# Patient Record
Sex: FEMALE | Race: BLACK OR AFRICAN AMERICAN | Employment: OTHER | URBAN - METROPOLITAN AREA
[De-identification: names, ages, dates, MRNs, and addresses within clinical notes are randomized per-mention and may not be internally consistent; named-entity substitution may affect disease eponyms.]

---

## 2017-03-09 ENCOUNTER — HOSPITAL ENCOUNTER (EMERGENCY)
Age: 82
Discharge: HOME | End: 2017-03-10
Attending: EMERGENCY MEDICINE
Payer: MEDICARE

## 2017-03-09 ENCOUNTER — APPOINTMENT (OUTPATIENT)
Dept: CT IMAGING | Age: 82
End: 2017-03-09
Attending: EMERGENCY MEDICINE
Payer: MEDICARE

## 2017-03-09 DIAGNOSIS — R53.1 WEAKNESS: Primary | ICD-10-CM

## 2017-03-09 DIAGNOSIS — Z91.14 NON COMPLIANCE W MEDICATION REGIMEN: ICD-10-CM

## 2017-03-09 LAB
ALBUMIN SERPL BCP-MCNC: 3.2 G/DL (ref 3.2–4.6)
ALBUMIN/GLOB SERPL: 0.7 {RATIO} (ref 1.2–3.5)
ALP SERPL-CCNC: 82 U/L (ref 50–136)
ALT SERPL-CCNC: 16 U/L (ref 12–65)
ANION GAP BLD CALC-SCNC: 8 MMOL/L (ref 7–16)
AST SERPL W P-5'-P-CCNC: 39 U/L (ref 15–37)
BASOPHILS # BLD AUTO: 0 K/UL (ref 0–0.2)
BASOPHILS # BLD: 0 % (ref 0–2)
BILIRUB SERPL-MCNC: 1.3 MG/DL (ref 0.2–1.1)
BUN SERPL-MCNC: 21 MG/DL (ref 8–23)
CALCIUM SERPL-MCNC: 9.9 MG/DL (ref 8.3–10.4)
CHLORIDE SERPL-SCNC: 107 MMOL/L (ref 98–107)
CO2 SERPL-SCNC: 28 MMOL/L (ref 21–32)
CREAT SERPL-MCNC: 1.53 MG/DL (ref 0.6–1)
DIFFERENTIAL METHOD BLD: ABNORMAL
EOSINOPHIL # BLD: 0 K/UL (ref 0–0.8)
EOSINOPHIL NFR BLD: 0 % (ref 0.5–7.8)
ERYTHROCYTE [DISTWIDTH] IN BLOOD BY AUTOMATED COUNT: 15 % (ref 11.9–14.6)
GLOBULIN SER CALC-MCNC: 4.8 G/DL (ref 2.3–3.5)
GLUCOSE SERPL-MCNC: 95 MG/DL (ref 65–100)
HCT VFR BLD AUTO: 41.7 % (ref 35.8–46.3)
HGB BLD-MCNC: 13.4 G/DL (ref 11.7–15.4)
IMM GRANULOCYTES # BLD: 0 K/UL (ref 0–0.5)
IMM GRANULOCYTES NFR BLD AUTO: 0.3 % (ref 0–5)
LACTATE BLD-SCNC: 1.6 MMOL/L (ref 0.5–1.9)
LYMPHOCYTES # BLD AUTO: 7 % (ref 13–44)
LYMPHOCYTES # BLD: 0.7 K/UL (ref 0.5–4.6)
MCH RBC QN AUTO: 26.9 PG (ref 26.1–32.9)
MCHC RBC AUTO-ENTMCNC: 32.1 G/DL (ref 31.4–35)
MCV RBC AUTO: 83.6 FL (ref 79.6–97.8)
MONOCYTES # BLD: 0.7 K/UL (ref 0.1–1.3)
MONOCYTES NFR BLD AUTO: 7 % (ref 4–12)
NEUTS SEG # BLD: 8 K/UL (ref 1.7–8.2)
NEUTS SEG NFR BLD AUTO: 86 % (ref 43–78)
PLATELET # BLD AUTO: 176 K/UL (ref 150–450)
PMV BLD AUTO: 12.1 FL (ref 10.8–14.1)
POTASSIUM SERPL-SCNC: 5.4 MMOL/L (ref 3.5–5.1)
PROT SERPL-MCNC: 8 G/DL (ref 6.3–8.2)
RBC # BLD AUTO: 4.99 M/UL (ref 4.05–5.25)
SODIUM SERPL-SCNC: 143 MMOL/L (ref 136–145)
WBC # BLD AUTO: 9.4 K/UL (ref 4.3–11.1)

## 2017-03-09 PROCEDURE — 84484 ASSAY OF TROPONIN QUANT: CPT

## 2017-03-09 PROCEDURE — 81003 URINALYSIS AUTO W/O SCOPE: CPT | Performed by: EMERGENCY MEDICINE

## 2017-03-09 PROCEDURE — 70450 CT HEAD/BRAIN W/O DYE: CPT

## 2017-03-09 PROCEDURE — 99285 EMERGENCY DEPT VISIT HI MDM: CPT | Performed by: EMERGENCY MEDICINE

## 2017-03-09 PROCEDURE — 83605 ASSAY OF LACTIC ACID: CPT

## 2017-03-09 PROCEDURE — 81015 MICROSCOPIC EXAM OF URINE: CPT | Performed by: EMERGENCY MEDICINE

## 2017-03-09 PROCEDURE — 85025 COMPLETE CBC W/AUTO DIFF WBC: CPT | Performed by: EMERGENCY MEDICINE

## 2017-03-09 PROCEDURE — 80053 COMPREHEN METABOLIC PANEL: CPT | Performed by: EMERGENCY MEDICINE

## 2017-03-10 VITALS
RESPIRATION RATE: 22 BRPM | BODY MASS INDEX: 29.63 KG/M2 | DIASTOLIC BLOOD PRESSURE: 89 MMHG | WEIGHT: 161 LBS | TEMPERATURE: 98 F | SYSTOLIC BLOOD PRESSURE: 164 MMHG | HEART RATE: 68 BPM | OXYGEN SATURATION: 98 % | HEIGHT: 62 IN

## 2017-03-10 LAB
BACTERIA URNS QL MICRO: 0 /HPF
CASTS URNS QL MICRO: NORMAL /LPF
EPI CELLS #/AREA URNS HPF: NORMAL /HPF
RBC #/AREA URNS HPF: NORMAL /HPF
WBC URNS QL MICRO: NORMAL /HPF

## 2017-03-10 NOTE — DISCHARGE INSTRUCTIONS
Fatigue: Care Instructions  Your Care Instructions  Fatigue is a feeling of tiredness, exhaustion, or lack of energy. You may feel fatigue because of too much or not enough activity. It can also come from stress, lack of sleep, boredom, and poor diet. Many medical problems, such as viral infections, can cause fatigue. Emotional problems, especially depression, are often the cause of fatigue. Fatigue is most often a symptom of another problem. Treatment for fatigue depends on the cause. For example, if you have fatigue because you have a certain health problem, treating this problem also treats your fatigue. If depression or anxiety is the cause, treatment may help. Follow-up care is a key part of your treatment and safety. Be sure to make and go to all appointments, and call your doctor if you are having problems. It's also a good idea to know your test results and keep a list of the medicines you take. How can you care for yourself at home? · Get regular exercise. But don't overdo it. Go back and forth between rest and exercise. · Get plenty of rest.  · Eat a healthy diet. Do not skip meals, especially breakfast.  · Reduce your use of caffeine, tobacco, and alcohol. Caffeine is most often found in coffee, tea, cola drinks, and chocolate. · Limit medicines that can cause fatigue. This includes tranquilizers and cold and allergy medicines. When should you call for help? Watch closely for changes in your health, and be sure to contact your doctor if:  · You have new symptoms such as fever or a rash. · Your fatigue gets worse. · You have been feeling down, depressed, or hopeless. Or you may have lost interest in things that you usually enjoy. · You are not getting better as expected. Where can you learn more? Go to http://raiza-betsy.info/. Enter B571 in the search box to learn more about \"Fatigue: Care Instructions. \"  Current as of: May 27, 2016  Content Version: 11.1  © 3949-7303 [employer], [employer]. Care instructions adapted under license by [employer] (which disclaims liability or warranty for this information). If you have questions about a medical condition or this instruction, always ask your healthcare professional. [address] any warranty or liability for your use of this information. Weakness: Care Instructions  Your Care Instructions  Weakness is a lack of physical or muscle strength. You may feel that you need to make extra effort to move your arms, legs, or other muscles. Generalized weakness means that you feel weak in most areas of your body. Another type of weakness may affect just one muscle or group of muscles. You may feel weak and tired after you have done too much activity, such as taking an extra-long hike. This is not a serious problem. It often goes away on its own. Feeling weak can also be caused by medical conditions like thyroid problems, depression, or a virus. Sometimes the cause can be serious. Your doctor may want to do more tests to try to find the cause of the weakness. The doctor has checked you carefully, but problems can develop later. If you notice any problems or new symptoms, get medical treatment right away. Follow-up care is a key part of your treatment and safety. Be sure to make and go to all appointments, and call your doctor if you are having problems. It's also a good idea to know your test results and keep a list of the medicines you take. How can you care for yourself at home? · Rest when you feel tired. · Be safe with medicines. If your doctor prescribed medicine, take it exactly as prescribed. Call your doctor if you think you are having a problem with your medicine. You will get more details on the specific medicines your doctor prescribes. · Do not skip meals. Eating a balanced diet may increase your energy level.   · Get some physical activity every day, but do not get too tired.  When should you call for help? Call your doctor now or seek immediate medical care if:  · You have new or worse weakness. · You are dizzy or lightheaded, or you feel like you may faint. Watch closely for changes in your health, and be sure to contact your doctor if:  · You do not get better as expected. Where can you learn more? Go to http://raiza-betsy.info/. Enter [id number] [id number] in the search box to learn more about \"Weakness: Care Instructions. \"  Current as of: May 27, 2016  Content Version: 11.1  © 4052-4087 [employer]. Care instructions adapted under license by [employer] (which disclaims liability or warranty for this information). If you have questions about a medical condition or this instruction, always ask your healthcare professional. [address] any warranty or liability for your use of this information.

## 2017-03-10 NOTE — ED TRIAGE NOTES
PT arrived via EMS. Per EMS PT got dizzy and laid down.  EMS was called for a welfare check and found PT on floor

## 2017-03-10 NOTE — ED PROVIDER NOTES
Patient is a 80 y.o. female presenting with dizziness. The history is provided by the patient. Dizziness   This is a new problem. The current episode started 3 to 5 hours ago. The problem has not changed since onset. There was no focality noted. Pertinent negatives include no focal weakness, no loss of sensation, no loss of balance, no slurred speech, no speech difficulty, no memory loss, no movement disorder, no agitation, no visual change, no auditory change, no mental status change, no unresponsiveness and no disorientation. There has been no fever. Pertinent negatives include no vomiting, no altered mental status, no confusion, no headaches, no choking and no nausea. Associated medical issues do not include trauma, mood changes, bleeding disorder, seizures, dementia, CVA or clotting disorder. Past Medical History:   Diagnosis Date    Anemia 4/21/2016    Anemia due to blood loss, acute 2014    Arthritis     osteo    Atrial fibrillation ([facility] [address] 4/21/2016    Cancer ([facility] [address] Jan 2002    left renal (nephrectomy) and cervical ([facility], [facility])  also had chemo and radiation    DVT (deep venous thrombosis) ([facility] [address] 7/2014    Dyspnea on exertion     started 2009.   No associated chest pain    Edema     Environmental allergies     controlled with occasional loratadine    GERD (gastroesophageal reflux disease)     controlled with prn Ranitidine    HLD (hyperlipidemia) 4/21/2016    Hyperlipidemia     Hypertension     controlled with medication    Hypokalemia 2014    Incisional hernia     Low serum sodium 2014    Melena 2014    PE (pulmonary embolism) 7/2014    Pulmonary HTN (HCC)     Tricuspid regurgitation     Mild to Moderate    Vitamin D deficiency        Past Surgical History:   Procedure Laterality Date    HX HERNIA REPAIR  6/15/12    Dr. Marce Fernandez HX UROLOGICAL  2002    left nephrectomy         Family History:   Problem Relation Age of Onset    Cancer Mother    [address] [facility] Hypertension Mother     Stroke Father     Heart Disease Father        Social History     Social History    Marital status: SINGLE     Spouse name: N/A    Number of children: N/A    Years of education: N/A     Occupational History    Housekeeping      Retired     Social History Main Topics    Smoking status: Former Smoker     Packs/day: 0.50     Years: 10.00     Types: Cigarettes     Quit date: 6/8/2000    Smokeless tobacco: Never Used      Comment: Stopped tobacco use 14 years ago    Alcohol use No    Drug use: No    Sexual activity: Not on file     Other Topics Concern    Not on file     Social History Narrative    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 ALLERGIES: Lipitor [atorvastatin] and Pcn [penicillins]    Review of Systems   Constitutional: Negative for chills and fever. Respiratory: Negative for choking. Gastrointestinal: Negative for nausea and vomiting. Neurological: Positive for dizziness. Negative for focal weakness, speech difficulty, headaches and loss of balance. Psychiatric/Behavioral: Negative for agitation, confusion and memory loss. All other systems reviewed and are negative. Vitals:    03/09/17 2116 03/09/17 2121 03/09/17 2122   BP: (!) 222/88 194/79    Pulse: 63 77 63   Resp: 14  19   Temp: 98.4 °F (36.9 °C)     SpO2: 97% 95% 96%   Weight: 73 kg (161 lb)     Height: 5' 2\" (1.575 m)              Physical Exam   Constitutional: She is oriented to person, place, and time. She appears well-developed and well-nourished. No distress. HENT:   Head: Normocephalic and atraumatic. Right Ear: Tympanic membrane and external ear normal.   Left Ear: Tympanic membrane and external ear normal.   Mouth/Throat: Oropharynx is clear and moist.   Eyes: Conjunctivae and EOM are normal. Pupils are equal, round, and reactive to light. Neck: Normal range of motion. Neck supple. No tracheal deviation present.    Cardiovascular: Normal rate, regular rhythm, normal heart sounds and intact distal pulses. Exam reveals no gallop and no friction rub. No murmur heard. Pulmonary/Chest: Effort normal and breath sounds normal. No respiratory distress. She has no wheezes. Abdominal: Soft. Bowel sounds are normal. She exhibits no distension and no mass. There is no hepatosplenomegaly. There is no tenderness. There is no rebound and no guarding. Musculoskeletal: Normal range of motion. She exhibits no edema. Lymphadenopathy:     She has no cervical adenopathy. Neurological: She is alert and oriented to person, place, and time. She has normal strength. She displays normal reflexes. No cranial nerve deficit or sensory deficit. Coordination normal.   Skin: Skin is warm and dry. No rash noted. She is not diaphoretic. No erythema. Psychiatric: She has a normal mood and affect. Her speech is normal and behavior is normal. Judgment and thought content normal. She exhibits abnormal recent memory. Nursing note and vitals reviewed. MDM  Number of Diagnoses or Management Options  Non compliance w medication regimen: new and requires workup  Weakness: new and requires workup     Amount and/or Complexity of Data Reviewed  Clinical lab tests: ordered and reviewed  Tests in the radiology section of CPT®: ordered and reviewed  Decide to obtain previous medical records or to obtain history from someone other than the patient: yes  Obtain history from someone other than the patient: yes  Review and summarize past medical records: yes  Independent visualization of images, tracings, or specimens: yes    Risk of Complications, Morbidity, and/or Mortality  Presenting problems: high  Diagnostic procedures: high  Management options: moderate    Patient Progress  Patient progress: stable    ED Course       Procedures      The patient was observed in the ED. Daughter will arrange home health to visit daily to ensure the patient is taking her medications.     Results Reviewed:      Recent Results (from the past 24 hour(s))   CBC WITH AUTOMATED DIFF    Collection Time: 03/09/17  9:19 PM   Result Value Ref Range    WBC 9.4 4.3 - 11.1 K/uL    RBC 4.99 4.05 - 5.25 M/uL    HGB 13.4 11.7 - 15.4 g/dL    HCT 41.7 35.8 - 46.3 %    MCV 83.6 79.6 - 97.8 FL    MCH 26.9 26.1 - 32.9 PG    MCHC 32.1 31.4 - 35.0 g/dL    RDW 15.0 (H) 11.9 - 14.6 %    PLATELET 263 410 - 863 K/uL    MPV 12.1 10.8 - 14.1 FL    DF AUTOMATED      NEUTROPHILS 86 (H) 43 - 78 %    LYMPHOCYTES 7 (L) 13 - 44 %    MONOCYTES 7 4.0 - 12.0 %    EOSINOPHILS 0 (L) 0.5 - 7.8 %    BASOPHILS 0 0.0 - 2.0 %    IMMATURE GRANULOCYTES 0.3 0.0 - 5.0 %    ABS. NEUTROPHILS 8.0 1.7 - 8.2 K/UL    ABS. LYMPHOCYTES 0.7 0.5 - 4.6 K/UL    ABS. MONOCYTES 0.7 0.1 - 1.3 K/UL    ABS. EOSINOPHILS 0.0 0.0 - 0.8 K/UL    ABS. BASOPHILS 0.0 0.0 - 0.2 K/UL    ABS. IMM. GRANS. 0.0 0.0 - 0.5 K/UL   METABOLIC PANEL, COMPREHENSIVE    Collection Time: 03/09/17  9:19 PM   Result Value Ref Range    Sodium 143 136 - 145 mmol/L    Potassium 5.4 (H) 3.5 - 5.1 mmol/L    Chloride 107 98 - 107 mmol/L    CO2 28 21 - 32 mmol/L    Anion gap 8 7 - 16 mmol/L    Glucose 95 65 - 100 mg/dL    BUN 21 8 - 23 MG/DL    Creatinine 1.53 (H) 0.6 - 1.0 MG/DL    GFR est AA 41 (L) >60 ml/min/1.73m2    GFR est non-AA 34 (L) >60 ml/min/1.73m2    Calcium 9.9 8.3 - 10.4 MG/DL    Bilirubin, total 1.3 (H) 0.2 - 1.1 MG/DL    ALT (SGPT) 16 12 - 65 U/L    AST (SGOT) 39 (H) 15 - 37 U/L    Alk. phosphatase 82 50 - 136 U/L    Protein, total 8.0 6.3 - 8.2 g/dL    Albumin 3.2 3.2 - 4.6 g/dL    Globulin 4.8 (H) 2.3 - 3.5 g/dL    A-G Ratio 0.7 (L) 1.2 - 3.5     POC LACTIC ACID    Collection Time: 03/09/17  9:22 PM   Result Value Ref Range    Lactic Acid (POC) 1.6 0.5 - 1.9 mmol/L     CT HEAD WO CONT   Final Result   IMPRESSION:      No acute intracranial abnormalities.       Date of Dictation: 3/9/2017 11:35 PM             I discussed the results of all labs, procedures, radiographs, and treatments with the patient and available family. Treatment plan is agreed upon and the patient is ready for discharge. All voiced understanding of the discharge plan and medication instructions or changes as appropriate. Questions about treatment in the ED were answered. All were encouraged to return should symptoms worsen or new problems develop.

## 2017-03-11 LAB — TROPONIN I BLD-MCNC: 0.01 NG/ML (ref 0–0.08)

## 2017-08-07 ENCOUNTER — APPOINTMENT (OUTPATIENT)
Dept: CT IMAGING | Age: 82
End: 2017-08-07
Attending: EMERGENCY MEDICINE
Payer: MEDICARE

## 2017-08-07 ENCOUNTER — HOSPITAL ENCOUNTER (EMERGENCY)
Age: 82
Discharge: HOME | End: 2017-08-07
Attending: EMERGENCY MEDICINE
Payer: MEDICARE

## 2017-08-07 VITALS
DIASTOLIC BLOOD PRESSURE: 91 MMHG | WEIGHT: 155 LBS | HEIGHT: 62 IN | OXYGEN SATURATION: 95 % | BODY MASS INDEX: 28.52 KG/M2 | RESPIRATION RATE: 18 BRPM | SYSTOLIC BLOOD PRESSURE: 152 MMHG | HEART RATE: 103 BPM | TEMPERATURE: 98.6 F

## 2017-08-07 DIAGNOSIS — E86.0 DEHYDRATION: ICD-10-CM

## 2017-08-07 DIAGNOSIS — I71.40 ABDOMINAL ANEURYSM: Primary | ICD-10-CM

## 2017-08-07 DIAGNOSIS — E87.6 HYPOKALEMIA: ICD-10-CM

## 2017-08-07 DIAGNOSIS — N18.9 CHRONIC KIDNEY DISEASE, UNSPECIFIED STAGE: ICD-10-CM

## 2017-08-07 LAB
ALBUMIN SERPL BCP-MCNC: 3.3 G/DL (ref 3.2–4.6)
ALBUMIN/GLOB SERPL: 0.7 {RATIO} (ref 1.2–3.5)
ALP SERPL-CCNC: 81 U/L (ref 50–136)
ALT SERPL-CCNC: 29 U/L (ref 12–65)
AMPHET UR QL SCN: NEGATIVE
ANION GAP BLD CALC-SCNC: 13 MMOL/L (ref 7–16)
AST SERPL W P-5'-P-CCNC: 27 U/L (ref 15–37)
BACTERIA URNS QL MICRO: 0 /HPF
BARBITURATES UR QL SCN: NEGATIVE
BASOPHILS # BLD AUTO: 0 K/UL (ref 0–0.2)
BASOPHILS # BLD: 0 % (ref 0–2)
BENZODIAZ UR QL: NEGATIVE
BILIRUB SERPL-MCNC: 1.2 MG/DL (ref 0.2–1.1)
BUN SERPL-MCNC: 31 MG/DL (ref 8–23)
CALCIUM SERPL-MCNC: 9.4 MG/DL (ref 8.3–10.4)
CANNABINOIDS UR QL SCN: NEGATIVE
CASTS URNS QL MICRO: NORMAL /LPF
CHLORIDE SERPL-SCNC: 104 MMOL/L (ref 98–107)
CK SERPL-CCNC: 428 U/L (ref 21–215)
CO2 SERPL-SCNC: 27 MMOL/L (ref 21–32)
COCAINE UR QL SCN: NEGATIVE
CREAT SERPL-MCNC: 2.19 MG/DL (ref 0.6–1)
DIFFERENTIAL METHOD BLD: ABNORMAL
EOSINOPHIL # BLD: 0.2 K/UL (ref 0–0.8)
EOSINOPHIL NFR BLD: 2 % (ref 0.5–7.8)
EPI CELLS #/AREA URNS HPF: NORMAL /HPF
ERYTHROCYTE [DISTWIDTH] IN BLOOD BY AUTOMATED COUNT: 14.9 % (ref 11.9–14.6)
ETHANOL SERPL-MCNC: <3 MG/DL
GLOBULIN SER CALC-MCNC: 4.5 G/DL (ref 2.3–3.5)
GLUCOSE BLD STRIP.AUTO-MCNC: 137 MG/DL (ref 65–100)
GLUCOSE SERPL-MCNC: 143 MG/DL (ref 65–100)
HCT VFR BLD AUTO: 43.4 % (ref 35.8–46.3)
HGB BLD-MCNC: 13.8 G/DL (ref 11.7–15.4)
IMM GRANULOCYTES # BLD: 0 K/UL (ref 0–0.5)
IMM GRANULOCYTES NFR BLD AUTO: 0.2 % (ref 0–5)
LACTATE BLD-SCNC: 2.4 MMOL/L (ref 0.5–1.9)
LYMPHOCYTES # BLD AUTO: 16 % (ref 13–44)
LYMPHOCYTES # BLD: 1.3 K/UL (ref 0.5–4.6)
MCH RBC QN AUTO: 26.2 PG (ref 26.1–32.9)
MCHC RBC AUTO-ENTMCNC: 31.8 G/DL (ref 31.4–35)
MCV RBC AUTO: 82.5 FL (ref 79.6–97.8)
METHADONE UR QL: NEGATIVE
MONOCYTES # BLD: 0.5 K/UL (ref 0.1–1.3)
MONOCYTES NFR BLD AUTO: 6 % (ref 4–12)
NEUTS SEG # BLD: 6.3 K/UL (ref 1.7–8.2)
NEUTS SEG NFR BLD AUTO: 76 % (ref 43–78)
OPIATES UR QL: NEGATIVE
PCP UR QL: NEGATIVE
PLATELET # BLD AUTO: 217 K/UL (ref 150–450)
PMV BLD AUTO: 11.5 FL (ref 10.8–14.1)
POTASSIUM SERPL-SCNC: 2.9 MMOL/L (ref 3.5–5.1)
PROT SERPL-MCNC: 7.8 G/DL (ref 6.3–8.2)
RBC # BLD AUTO: 5.26 M/UL (ref 4.05–5.25)
RBC #/AREA URNS HPF: NORMAL /HPF
SODIUM SERPL-SCNC: 144 MMOL/L (ref 136–145)
WBC # BLD AUTO: 8.3 K/UL (ref 4.3–11.1)
WBC URNS QL MICRO: NORMAL /HPF

## 2017-08-07 PROCEDURE — 74176 CT ABD & PELVIS W/O CONTRAST: CPT

## 2017-08-07 PROCEDURE — 74011250636 HC RX REV CODE- 250/636: Performed by: EMERGENCY MEDICINE

## 2017-08-07 PROCEDURE — 81003 URINALYSIS AUTO W/O SCOPE: CPT | Performed by: EMERGENCY MEDICINE

## 2017-08-07 PROCEDURE — 96361 HYDRATE IV INFUSION ADD-ON: CPT | Performed by: EMERGENCY MEDICINE

## 2017-08-07 PROCEDURE — 82962 GLUCOSE BLOOD TEST: CPT

## 2017-08-07 PROCEDURE — 82550 ASSAY OF CK (CPK): CPT | Performed by: EMERGENCY MEDICINE

## 2017-08-07 PROCEDURE — 81015 MICROSCOPIC EXAM OF URINE: CPT | Performed by: EMERGENCY MEDICINE

## 2017-08-07 PROCEDURE — 80053 COMPREHEN METABOLIC PANEL: CPT | Performed by: EMERGENCY MEDICINE

## 2017-08-07 PROCEDURE — 96360 HYDRATION IV INFUSION INIT: CPT | Performed by: EMERGENCY MEDICINE

## 2017-08-07 PROCEDURE — 74011250637 HC RX REV CODE- 250/637: Performed by: EMERGENCY MEDICINE

## 2017-08-07 PROCEDURE — 83605 ASSAY OF LACTIC ACID: CPT

## 2017-08-07 PROCEDURE — 80307 DRUG TEST PRSMV CHEM ANLYZR: CPT | Performed by: EMERGENCY MEDICINE

## 2017-08-07 PROCEDURE — 85025 COMPLETE CBC W/AUTO DIFF WBC: CPT | Performed by: EMERGENCY MEDICINE

## 2017-08-07 PROCEDURE — 99285 EMERGENCY DEPT VISIT HI MDM: CPT | Performed by: EMERGENCY MEDICINE

## 2017-08-07 RX ORDER — SODIUM CHLORIDE 9 MG/ML
500 INJECTION, SOLUTION INTRAVENOUS ONCE
Status: DISCONTINUED | OUTPATIENT
Start: 2017-08-07 | End: 2017-08-07

## 2017-08-07 RX ORDER — POTASSIUM CHLORIDE 20 MEQ/1
40 TABLET, EXTENDED RELEASE ORAL
Status: COMPLETED | OUTPATIENT
Start: 2017-08-07 | End: 2017-08-07

## 2017-08-07 RX ORDER — SODIUM CHLORIDE 9 MG/ML
1000 INJECTION, SOLUTION INTRAVENOUS ONCE
Status: COMPLETED | OUTPATIENT
Start: 2017-08-07 | End: 2017-08-07

## 2017-08-07 RX ADMIN — SODIUM CHLORIDE 1000 ML: 900 INJECTION, SOLUTION INTRAVENOUS at 16:45

## 2017-08-07 RX ADMIN — POTASSIUM CHLORIDE 40 MEQ: 20 TABLET, EXTENDED RELEASE ORAL at 19:18

## 2017-08-07 NOTE — ED PROVIDER NOTES
HPI Comments: Found on floor by family on Saturday. Possibly dehydrated with recent diarrhea. Mental status improved but still globally weak. Patient is a 80 y.o. female presenting with altered mental status. The history is provided by the patient and a relative. Altered mental status    This is a new problem. The current episode started yesterday. The problem has been gradually improving. Associated symptoms include confusion, unresponsiveness and weakness. Mental status baseline is mild dementia. Risk factors include trauma, dementia and a recent illness. Her past medical history is significant for dementia. Her past medical history does not include seizures or CVA. Past Medical History:   Diagnosis Date    Anemia 4/21/2016    Anemia due to blood loss, acute 2014    Arthritis     osteo    Atrial fibrillation ([address] 4/21/2016    Cancer ([address] Jan 2002    left renal (nephrectomy) and cervical ([facility], [facility])  also had chemo and radiation    DVT (deep venous thrombosis) ([facility] [address] 7/2014    Dyspnea on exertion     started 2009.   No associated chest pain    Edema     Environmental allergies     controlled with occasional loratadine    GERD (gastroesophageal reflux disease)     controlled with prn Ranitidine    HLD (hyperlipidemia) 4/21/2016    Hyperlipidemia     Hypertension     controlled with medication    Hypokalemia 2014    Incisional hernia     Low serum sodium 2014    Melena 2014    PE (pulmonary embolism) 7/2014    Pulmonary HTN (HCC)     Tricuspid regurgitation     Mild to Moderate    Vitamin D deficiency        Past Surgical History:   Procedure Laterality Date    HX HERNIA REPAIR  6/15/12    Dr. Donna Lopez HX UROLOGICAL  2002    left nephrectomy         Family History:   Problem Relation Age of Onset   [employer] Cancer Mother     Hypertension Mother     Stroke Father     Heart Disease Father        Social History     Social History    Marital status: SINGLE Spouse name: N/A    Number of children: N/A    Years of education: N/A     Occupational History    Housekeeping      Retired     Social History Main Topics    Smoking status: Former Smoker     Packs/day: 0.50     Years: 10.00     Types: Cigarettes     Quit date: 6/8/2000    Smokeless tobacco: Never Used      Comment: Stopped tobacco use 14 years ago    Alcohol use No    Drug use: No    Sexual activity: Not on file     Other Topics Concern    Not on file     Social History Narrative    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 ALLERGIES: Lipitor [atorvastatin] and Pcn [penicillins]    Review of Systems   Neurological: Positive for weakness. Psychiatric/Behavioral: Positive for confusion. Vitals:    08/07/17 1450 08/07/17 1600 08/07/17 1722 08/07/17 1741   BP: 132/76  158/70 158/71   Pulse: (!) 103      Resp: 20      Temp: 98.4 °F (36.9 °C)      SpO2: 98% 98% 97% (!) 88%   Weight: 70.3 kg (155 lb)      Height: 5' 2\" (1.575 m)               Physical Exam   Constitutional: She is oriented to person, place, and time. She appears well-developed and well-nourished. HENT:   Head: Normocephalic and atraumatic. Cardiovascular: Intact distal pulses. Pulmonary/Chest: Effort normal.   Abdominal: Soft. Neurological: She is alert and oriented to person, place, and time. Skin: Skin is warm and dry. Psychiatric: She has a normal mood and affect. Her behavior is normal.   Nursing note and vitals reviewed. MDM  Number of Diagnoses or Management Options  Abdominal aneurysm ([address]): new and requires workup  Chronic kidney disease, unspecified stage: new and requires workup  Dehydration: new and requires workup  Hypokalemia: new and requires workup  Diagnosis management comments: Discussed results with patient and family. According to family members bedside patient looks much better at this time. Appears was moderately dehydrated after not eating or drinking for the last 24-48 hours. Patient has had some fluids orally in the ER without any complications. Plans currently. Recommend short-term follow-up. Incidental findings on CT discussed with family and routine follow-up also suggested. Mild hypokalemia, by mouth provided the ER and suggested this be followed by an outpatient physician. Unlikely to be related to patient's confusion as described by family members. Abnormal vital documented at time of discharge but appears to be anomalous as patient has not had any abnormalities up to this point and on recheck myself appears to be stable with unchanged vital signs. I discussed the results of all labs, procedures, radiographs, and treatments with the patient and available family. Treatment plan is agreed upon and the patient is ready for discharge. Questions about treatment in the ED and differential diagnosis of presenting condition were answered. Patient was given verbal discharge instructions including, but not limited to, importance of returning to the emergency department for any concern of worsening or continued symptoms. Instructions were given to follow up with a primary care provider or specialist within 1-2 days. Adverse effects of medications, if prescribed, were discussed and patient was advised to refrain from significant physical activity until followed up by primary care physician and to not drive or operate heavy machinery after taking any sedating substances.         ED Course       Procedures

## 2017-08-07 NOTE — ED TRIAGE NOTES
Per the family the has not been eating, found the pt on the floor Saturday night. Family states the pt had diarrhea today and the home health care told the family to come to the hospital.  Pt is confused to place and time. Pt denies any pain.  Family states

## 2018-04-12 ENCOUNTER — HOSPITAL ENCOUNTER (OUTPATIENT)
Dept: LAB | Age: 83
Discharge: HOME | End: 2018-04-12
Payer: COMMERCIAL

## 2018-04-12 DIAGNOSIS — D64.9 ANEMIA, UNSPECIFIED TYPE: ICD-10-CM

## 2018-04-12 LAB
BASOPHILS # BLD: 0 K/UL (ref 0–0.2)
BASOPHILS NFR BLD: 0 % (ref 0–2)
DIFFERENTIAL METHOD BLD: ABNORMAL
EOSINOPHIL # BLD: 0.2 K/UL (ref 0–0.8)
EOSINOPHIL NFR BLD: 3 % (ref 0.5–7.8)
ERYTHROCYTE [DISTWIDTH] IN BLOOD BY AUTOMATED COUNT: 15 % (ref 11.9–14.6)
HCT VFR BLD AUTO: 37.4 % (ref 35.8–46.3)
HGB BLD-MCNC: 11.7 G/DL (ref 11.7–15.4)
LYMPHOCYTES # BLD: 1.1 K/UL (ref 0.5–4.6)
LYMPHOCYTES NFR BLD: 18 % (ref 13–44)
MCH RBC QN AUTO: 26.3 PG (ref 26.1–32.9)
MCHC RBC AUTO-ENTMCNC: 31.3 G/DL (ref 31.4–35)
MCV RBC AUTO: 84 FL (ref 79.6–97.8)
MONOCYTES # BLD: 0.5 K/UL (ref 0.1–1.3)
MONOCYTES NFR BLD: 7 % (ref 4–12)
NEUTS SEG # BLD: 4.5 K/UL (ref 1.7–8.2)
NEUTS SEG NFR BLD: 72 % (ref 43–78)
PLATELET # BLD AUTO: 165 K/UL (ref 150–450)
PMV BLD AUTO: 11.7 FL (ref 10.8–14.1)
RBC # BLD AUTO: 4.45 M/UL (ref 4.05–5.25)
WBC # BLD AUTO: 6.3 K/UL (ref 4.3–11.1)

## 2018-04-12 PROCEDURE — 85025 COMPLETE CBC W/AUTO DIFF WBC: CPT | Performed by: INTERNAL MEDICINE

## 2018-04-12 PROCEDURE — 36415 COLL VENOUS BLD VENIPUNCTURE: CPT | Performed by: INTERNAL MEDICINE

## 2018-05-11 ENCOUNTER — HOSPITAL ENCOUNTER (INPATIENT)
Age: 83
LOS: 6 days | Discharge: NURSING, REHAB OR LONG-TERM CARE | DRG: 871 | End: 2018-05-18
Attending: EMERGENCY MEDICINE | Admitting: FAMILY MEDICINE
Payer: COMMERCIAL

## 2018-05-11 ENCOUNTER — APPOINTMENT (OUTPATIENT)
Dept: GENERAL RADIOLOGY | Age: 83
DRG: 871 | End: 2018-05-11
Attending: EMERGENCY MEDICINE
Payer: COMMERCIAL

## 2018-05-11 ENCOUNTER — APPOINTMENT (OUTPATIENT)
Dept: GENERAL RADIOLOGY | Age: 83
DRG: 871 | End: 2018-05-11
Attending: FAMILY MEDICINE
Payer: COMMERCIAL

## 2018-05-11 DIAGNOSIS — R53.81 DEBILITY: ICD-10-CM

## 2018-05-11 DIAGNOSIS — R53.1 WEAKNESS: Primary | ICD-10-CM

## 2018-05-11 DIAGNOSIS — D72.829 LEUKOCYTOSIS, UNSPECIFIED TYPE: ICD-10-CM

## 2018-05-11 LAB
ALBUMIN SERPL-MCNC: 3 G/DL (ref 3.2–4.6)
ALBUMIN/GLOB SERPL: 0.6 {RATIO} (ref 1.2–3.5)
ALP SERPL-CCNC: 77 U/L (ref 50–136)
ALT SERPL-CCNC: 22 U/L (ref 12–65)
ANION GAP SERPL CALC-SCNC: 12 MMOL/L (ref 7–16)
AST SERPL-CCNC: 31 U/L (ref 15–37)
BACTERIA URNS QL MICRO: 0 /HPF
BASOPHILS # BLD: 0 K/UL (ref 0–0.2)
BASOPHILS # BLD: 0 K/UL (ref 0–0.2)
BASOPHILS NFR BLD: 0 % (ref 0–2)
BASOPHILS NFR BLD: 0 % (ref 0–2)
BILIRUB SERPL-MCNC: 1.3 MG/DL (ref 0.2–1.1)
BUN SERPL-MCNC: 21 MG/DL (ref 8–23)
CALCIUM SERPL-MCNC: 9.7 MG/DL (ref 8.3–10.4)
CASTS URNS QL MICRO: NORMAL /LPF
CHLORIDE SERPL-SCNC: 109 MMOL/L (ref 98–107)
CK SERPL-CCNC: 337 U/L (ref 21–215)
CO2 SERPL-SCNC: 22 MMOL/L (ref 21–32)
CREAT SERPL-MCNC: 1.59 MG/DL (ref 0.6–1)
DIFFERENTIAL METHOD BLD: ABNORMAL
DIFFERENTIAL METHOD BLD: ABNORMAL
EOSINOPHIL # BLD: 0 K/UL (ref 0–0.8)
EOSINOPHIL # BLD: 0 K/UL (ref 0–0.8)
EOSINOPHIL NFR BLD: 0 % (ref 0.5–7.8)
EOSINOPHIL NFR BLD: 0 % (ref 0.5–7.8)
EPI CELLS #/AREA URNS HPF: NORMAL /HPF
ERYTHROCYTE [DISTWIDTH] IN BLOOD BY AUTOMATED COUNT: 15.3 % (ref 11.9–14.6)
ERYTHROCYTE [DISTWIDTH] IN BLOOD BY AUTOMATED COUNT: 15.4 % (ref 11.9–14.6)
GLOBULIN SER CALC-MCNC: 4.7 G/DL (ref 2.3–3.5)
GLUCOSE SERPL-MCNC: 165 MG/DL (ref 65–100)
HCT VFR BLD AUTO: 40.9 % (ref 35.8–46.3)
HCT VFR BLD AUTO: 41.1 % (ref 35.8–46.3)
HGB BLD-MCNC: 13.1 G/DL (ref 11.7–15.4)
HGB BLD-MCNC: 13.2 G/DL (ref 11.7–15.4)
IMM GRANULOCYTES # BLD: 0 K/UL (ref 0–0.5)
IMM GRANULOCYTES # BLD: 0.1 K/UL (ref 0–0.5)
IMM GRANULOCYTES NFR BLD AUTO: 0 % (ref 0–5)
IMM GRANULOCYTES NFR BLD AUTO: 0 % (ref 0–5)
LYMPHOCYTES # BLD: 0.8 K/UL (ref 0.5–4.6)
LYMPHOCYTES # BLD: 1 K/UL (ref 0.5–4.6)
LYMPHOCYTES NFR BLD: 6 % (ref 13–44)
LYMPHOCYTES NFR BLD: 8 % (ref 13–44)
MCH RBC QN AUTO: 26.5 PG (ref 26.1–32.9)
MCH RBC QN AUTO: 26.8 PG (ref 26.1–32.9)
MCHC RBC AUTO-ENTMCNC: 31.9 G/DL (ref 31.4–35)
MCHC RBC AUTO-ENTMCNC: 32.3 G/DL (ref 31.4–35)
MCV RBC AUTO: 83 FL (ref 79.6–97.8)
MCV RBC AUTO: 83.1 FL (ref 79.6–97.8)
MONOCYTES # BLD: 0.1 K/UL (ref 0.1–1.3)
MONOCYTES # BLD: 0.7 K/UL (ref 0.1–1.3)
MONOCYTES NFR BLD: 1 % (ref 4–12)
MONOCYTES NFR BLD: 6 % (ref 4–12)
NEUTS SEG # BLD: 11 K/UL (ref 1.7–8.2)
NEUTS SEG # BLD: 11.2 K/UL (ref 1.7–8.2)
NEUTS SEG NFR BLD: 88 % (ref 43–78)
NEUTS SEG NFR BLD: 91 % (ref 43–78)
PLATELET # BLD AUTO: 148 K/UL (ref 150–450)
PLATELET # BLD AUTO: 178 K/UL (ref 150–450)
PMV BLD AUTO: 11 FL (ref 10.8–14.1)
PMV BLD AUTO: 11.5 FL (ref 10.8–14.1)
POTASSIUM SERPL-SCNC: 4.8 MMOL/L (ref 3.5–5.1)
PROT SERPL-MCNC: 7.7 G/DL (ref 6.3–8.2)
RBC # BLD AUTO: 4.92 M/UL (ref 4.05–5.25)
RBC # BLD AUTO: 4.95 M/UL (ref 4.05–5.25)
RBC #/AREA URNS HPF: NORMAL /HPF
SODIUM SERPL-SCNC: 143 MMOL/L (ref 136–145)
WBC # BLD AUTO: 12.1 K/UL (ref 4.3–11.1)
WBC # BLD AUTO: 12.8 K/UL (ref 4.3–11.1)
WBC URNS QL MICRO: NORMAL /HPF

## 2018-05-11 PROCEDURE — 81003 URINALYSIS AUTO W/O SCOPE: CPT | Performed by: EMERGENCY MEDICINE

## 2018-05-11 PROCEDURE — 81015 MICROSCOPIC EXAM OF URINE: CPT | Performed by: EMERGENCY MEDICINE

## 2018-05-11 PROCEDURE — 80053 COMPREHEN METABOLIC PANEL: CPT | Performed by: EMERGENCY MEDICINE

## 2018-05-11 PROCEDURE — 74011250637 HC RX REV CODE- 250/637: Performed by: FAMILY MEDICINE

## 2018-05-11 PROCEDURE — 83690 ASSAY OF LIPASE: CPT | Performed by: EMERGENCY MEDICINE

## 2018-05-11 PROCEDURE — 99218 HC RM OBSERVATION: CPT

## 2018-05-11 PROCEDURE — 99285 EMERGENCY DEPT VISIT HI MDM: CPT | Performed by: EMERGENCY MEDICINE

## 2018-05-11 PROCEDURE — 87040 BLOOD CULTURE FOR BACTERIA: CPT | Performed by: EMERGENCY MEDICINE

## 2018-05-11 PROCEDURE — 74011250637 HC RX REV CODE- 250/637: Performed by: EMERGENCY MEDICINE

## 2018-05-11 PROCEDURE — 87205 SMEAR GRAM STAIN: CPT | Performed by: EMERGENCY MEDICINE

## 2018-05-11 PROCEDURE — 71045 X-RAY EXAM CHEST 1 VIEW: CPT

## 2018-05-11 PROCEDURE — 71046 X-RAY EXAM CHEST 2 VIEWS: CPT

## 2018-05-11 PROCEDURE — 36415 COLL VENOUS BLD VENIPUNCTURE: CPT | Performed by: FAMILY MEDICINE

## 2018-05-11 PROCEDURE — 86580 TB INTRADERMAL TEST: CPT | Performed by: FAMILY MEDICINE

## 2018-05-11 PROCEDURE — 74011000302 HC RX REV CODE- 302: Performed by: FAMILY MEDICINE

## 2018-05-11 PROCEDURE — 85025 COMPLETE CBC W/AUTO DIFF WBC: CPT | Performed by: EMERGENCY MEDICINE

## 2018-05-11 PROCEDURE — 80048 BASIC METABOLIC PNL TOTAL CA: CPT | Performed by: EMERGENCY MEDICINE

## 2018-05-11 PROCEDURE — 82550 ASSAY OF CK (CPK): CPT | Performed by: EMERGENCY MEDICINE

## 2018-05-11 PROCEDURE — 74011250636 HC RX REV CODE- 250/636: Performed by: FAMILY MEDICINE

## 2018-05-11 PROCEDURE — 87077 CULTURE AEROBIC IDENTIFY: CPT | Performed by: EMERGENCY MEDICINE

## 2018-05-11 PROCEDURE — 84484 ASSAY OF TROPONIN QUANT: CPT | Performed by: EMERGENCY MEDICINE

## 2018-05-11 PROCEDURE — 87186 SC STD MICRODIL/AGAR DIL: CPT | Performed by: EMERGENCY MEDICINE

## 2018-05-11 RX ORDER — ONDANSETRON 2 MG/ML
4 INJECTION INTRAMUSCULAR; INTRAVENOUS
Status: DISCONTINUED | OUTPATIENT
Start: 2018-05-11 | End: 2018-05-18 | Stop reason: HOSPADM

## 2018-05-11 RX ORDER — ACETAMINOPHEN 325 MG/1
650 TABLET ORAL
Status: DISCONTINUED | OUTPATIENT
Start: 2018-05-11 | End: 2018-05-18 | Stop reason: HOSPADM

## 2018-05-11 RX ORDER — SODIUM CHLORIDE 0.9 % (FLUSH) 0.9 %
5-10 SYRINGE (ML) INJECTION AS NEEDED
Status: DISCONTINUED | OUTPATIENT
Start: 2018-05-11 | End: 2018-05-18 | Stop reason: HOSPADM

## 2018-05-11 RX ORDER — LORATADINE 10 MG/1
10 TABLET ORAL DAILY
Status: DISCONTINUED | OUTPATIENT
Start: 2018-05-12 | End: 2018-05-18 | Stop reason: HOSPADM

## 2018-05-11 RX ORDER — METOPROLOL TARTRATE 25 MG/1
25 TABLET, FILM COATED ORAL 2 TIMES DAILY
Status: DISCONTINUED | OUTPATIENT
Start: 2018-05-12 | End: 2018-05-18 | Stop reason: HOSPADM

## 2018-05-11 RX ORDER — ADHESIVE BANDAGE
30 BANDAGE TOPICAL DAILY PRN
Status: DISCONTINUED | OUTPATIENT
Start: 2018-05-11 | End: 2018-05-18 | Stop reason: HOSPADM

## 2018-05-11 RX ORDER — SODIUM CHLORIDE 0.9 % (FLUSH) 0.9 %
5-10 SYRINGE (ML) INJECTION EVERY 8 HOURS
Status: DISCONTINUED | OUTPATIENT
Start: 2018-05-11 | End: 2018-05-18 | Stop reason: HOSPADM

## 2018-05-11 RX ORDER — SODIUM CHLORIDE 9 MG/ML
75 INJECTION, SOLUTION INTRAVENOUS CONTINUOUS
Status: DISCONTINUED | OUTPATIENT
Start: 2018-05-11 | End: 2018-05-13

## 2018-05-11 RX ORDER — DONEPEZIL HYDROCHLORIDE 5 MG/1
5 TABLET, FILM COATED ORAL
Status: DISCONTINUED | OUTPATIENT
Start: 2018-05-11 | End: 2018-05-18 | Stop reason: HOSPADM

## 2018-05-11 RX ORDER — AMLODIPINE BESYLATE 5 MG/1
5 TABLET ORAL DAILY
Status: DISCONTINUED | OUTPATIENT
Start: 2018-05-12 | End: 2018-05-18 | Stop reason: HOSPADM

## 2018-05-11 RX ORDER — ACETAMINOPHEN 325 MG/1
650 TABLET ORAL
Status: COMPLETED | OUTPATIENT
Start: 2018-05-11 | End: 2018-05-11

## 2018-05-11 RX ADMIN — DONEPEZIL HYDROCHLORIDE 5 MG: 5 TABLET, FILM COATED ORAL at 23:33

## 2018-05-11 RX ADMIN — TUBERCULIN PURIFIED PROTEIN DERIVATIVE 5 UNITS: 5 INJECTION, SOLUTION INTRADERMAL at 23:36

## 2018-05-11 RX ADMIN — SODIUM CHLORIDE 75 ML/HR: 900 INJECTION, SOLUTION INTRAVENOUS at 23:15

## 2018-05-11 RX ADMIN — Medication 10 ML: at 23:34

## 2018-05-11 RX ADMIN — ACETAMINOPHEN 650 MG: 325 TABLET ORAL at 18:42

## 2018-05-11 NOTE — ED TRIAGE NOTES
Pt arrives via [facility], pt c/o N/V x1 day. Recently started amlodipine. Pt c/o overall weakness. Pt family states that she laid herself down on the floor this morning because she was feeling weak and tired, not sleeping well. Pt has no complaints.

## 2018-05-11 NOTE — ED PROVIDER NOTES
HPI Comments: Elderly patient that lives alone and has some family support plus home health type care. On arrival up home health will today she was found to be in the floor. They're uncertain how long she was there. Patient states that she did not fall nor have any injuries but felt weak and laid down onto the floor and could not not get up. No awareness of fever she did get up and eat breakfast after eating breakfast had gotten up and began to walk when she started to vomit. Denies any abdominal pain. No lower GI symptoms. stage III renal disease with history of left nephrectomy due to malignancy    Patient is a 80 y.o. female presenting with fatigue. The history is provided by the patient. Fatigue   This is a new problem. The current episode started 6 to 12 hours ago. There was no focality noted. Primary symptoms include mental status change. Pertinent negatives include no focal weakness, no loss of sensation, no loss of balance, no speech difficulty and no disorientation. Maximum temperature: uncertain. Associated symptoms include vomiting. Pertinent negatives include no chest pain, no confusion and no headaches. Associated medical issues do not include trauma or seizures. Past Medical History:   Diagnosis Date    Anemia 4/21/2016    Anemia due to blood loss, acute 2014    Arthritis     osteo    Atrial fibrillation ([address] 4/21/2016    Cancer ([facility] [address] Jan 2002    left renal (nephrectomy) and cervical (ROSANNA, BSO)  also had chemo and radiation    DVT (deep venous thrombosis) ([facility] [address] 7/2014    Dyspnea on exertion     started 2009.   No associated chest pain    Edema     Environmental allergies     controlled with occasional loratadine    GERD (gastroesophageal reflux disease)     controlled with prn Ranitidine    HLD (hyperlipidemia) 4/21/2016    Hyperlipidemia     Hypertension     controlled with medication    Hypokalemia 2014    Incisional hernia     Low serum sodium 2014    Melena 2014    PE (pulmonary embolism) 7/2014    Pulmonary HTN (HCC)     Tricuspid regurgitation     Mild to Moderate    Vitamin D deficiency        Past Surgical History:   Procedure Laterality Date    HX HERNIA REPAIR  6/15/12    Dr. Kathalene Olszewski HX UROLOGICAL  2002    left nephrectomy         Family History:   Problem Relation Age of Onset    Cancer Mother     Hypertension Mother     Stroke Father     Heart Disease Father        Social History     Social History    Marital status: SINGLE     Spouse name: N/A    Number of children: N/A    Years of education: N/A     Occupational History    Housekeeping      Retired     Social History Main Topics    Smoking status: Former Smoker     Packs/day: 0.50     Years: 10.00     Types: Cigarettes     Quit date: 6/8/2000    Smokeless tobacco: Never Used      Comment: Stopped tobacco use 14 years ago    Alcohol use No    Drug use: No    Sexual activity: Not on file     Other Topics Concern    Not on file     Social History Narrative    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 ALLERGIES: Lipitor [atorvastatin] and Pcn [penicillins]    Review of Systems   Constitutional: Positive for fatigue. Respiratory: Negative. Cardiovascular: Negative for chest pain. Gastrointestinal: Positive for vomiting. Negative for abdominal pain and diarrhea. Genitourinary: Positive for decreased urine volume. Neurological: Negative. Negative for focal weakness, speech difficulty, headaches and loss of balance. Psychiatric/Behavioral: Positive for decreased concentration. Negative for confusion. All other systems reviewed and are negative. Vitals:    05/11/18 1140 05/11/18 1201   BP: 147/64 162/71   Pulse: (!) 56    Resp: 16    Temp: 97.8 °F (36.6 °C)    SpO2: 97% 96%   Weight: 69.4 kg (153 lb)    Height: 5' 2\" (1.575 m)             Physical Exam   Constitutional: She appears well-developed and well-nourished. No distress.    Quite frail and elderly appearing  Attempt to stand for orthostatics needed assistance   HENT:   Head: Atraumatic. Somewhat dry mucous membranes   Eyes: No scleral icterus. Neck: Neck supple. Cardiovascular: Normal rate. Pulmonary/Chest: Effort normal. No respiratory distress. She has no wheezes. Abdominal: Soft. She exhibits no distension. There is no tenderness. There is no rebound and no guarding. Musculoskeletal: Normal range of motion. Neurological: She is alert. She has normal reflexes. Skin: Skin is warm and dry. Psychiatric: Thought content normal.   Nursing note and vitals reviewed. MDM  Number of Diagnoses or Management Options  Diagnosis management comments: Here with weakness and ending up in the floor no definitive infectious cause but develops a low-grade temperature and never really with rehydration and improved close to her baseline. She will need admission for further evaluation for probable early infectious etiology. Discussed with accompanying daughter.   She is in agreement       Amount and/or Complexity of Data Reviewed  Clinical lab tests: ordered and reviewed  Tests in the radiology section of CPT®: reviewed and ordered  Decide to obtain previous medical records or to obtain history from someone other than the patient: yes  Obtain history from someone other than the patient: yes  Discuss the patient with other providers: yes    Risk of Complications, Morbidity, and/or Mortality  Presenting problems: high  Diagnostic procedures: low  Management options: moderate    Patient Progress  Patient progress: stable        ED Course       Procedures      Recent Results (from the past 12 hour(s))   CBC WITH AUTOMATED DIFF    Collection Time: 05/11/18 11:54 AM   Result Value Ref Range    WBC 12.1 (H) 4.3 - 11.1 K/uL    RBC 4.92 4.05 - 5.25 M/uL    HGB 13.2 11.7 - 15.4 g/dL    HCT 40.9 35.8 - 46.3 %    MCV 83.1 79.6 - 97.8 FL    MCH 26.8 26.1 - 32.9 PG    MCHC 32.3 31.4 - 35.0 g/dL    RDW 15.4 (H) 11.9 - 14.6 %    PLATELET 435 967 - 458 K/uL    MPV 11.5 10.8 - 14.1 FL    DF AUTOMATED      NEUTROPHILS 91 (H) 43 - 78 %    LYMPHOCYTES 8 (L) 13 - 44 %    MONOCYTES 1 (L) 4.0 - 12.0 %    EOSINOPHILS 0 (L) 0.5 - 7.8 %    BASOPHILS 0 0.0 - 2.0 %    IMMATURE GRANULOCYTES 0 0.0 - 5.0 %    ABS. NEUTROPHILS 11.0 (H) 1.7 - 8.2 K/UL    ABS. LYMPHOCYTES 1.0 0.5 - 4.6 K/UL    ABS. MONOCYTES 0.1 0.1 - 1.3 K/UL    ABS. EOSINOPHILS 0.0 0.0 - 0.8 K/UL    ABS. BASOPHILS 0.0 0.0 - 0.2 K/UL    ABS. IMM. GRANS. 0.0 0.0 - 0.5 K/UL   METABOLIC PANEL, COMPREHENSIVE    Collection Time: 05/11/18 11:54 AM   Result Value Ref Range    Sodium 143 136 - 145 mmol/L    Potassium 4.8 3.5 - 5.1 mmol/L    Chloride 109 (H) 98 - 107 mmol/L    CO2 22 21 - 32 mmol/L    Anion gap 12 7 - 16 mmol/L    Glucose 165 (H) 65 - 100 mg/dL    BUN 21 8 - 23 MG/DL    Creatinine 1.59 (H) 0.6 - 1.0 MG/DL    GFR est AA 40 (L) >60 ml/min/1.73m2    GFR est non-AA 33 (L) >60 ml/min/1.73m2    Calcium 9.7 8.3 - 10.4 MG/DL    Bilirubin, total 1.3 (H) 0.2 - 1.1 MG/DL    ALT (SGPT) 22 12 - 65 U/L    AST (SGOT) 31 15 - 37 U/L    Alk. phosphatase 77 50 - 136 U/L    Protein, total 7.7 6.3 - 8.2 g/dL    Albumin 3.0 (L) 3.2 - 4.6 g/dL    Globulin 4.7 (H) 2.3 - 3.5 g/dL    A-G Ratio 0.6 (L) 1.2 - 3.5     CK    Collection Time: 05/11/18 11:54 AM   Result Value Ref Range     (H) 21 - 215 U/L   URINE MICROSCOPIC    Collection Time: 05/11/18  1:17 PM   Result Value Ref Range    WBC 0-3 0 /hpf    RBC 0-3 0 /hpf    Epithelial cells 0-3 0 /hpf    Bacteria 0 0 /hpf    Casts 0-3 0 /lpf       Final result (Exam End: 5/11/2018  7:29 PM) Open        Study Result      Chest X-ray  5/11/2018 7:29 PM     Clinical indication:  80year-old with low-grade fever.       Comparison: 1/29/2015.     Findings: Semiupright portable chest at 7:01 PM. The patient is markedly  kyphotic, and the chin projects over the thoracic inlet and lung apices.  The  lungs are markedly underventilated with perihilar vascular crowding. No focal  airspace consolidation nor edema. Stable cardiomegaly.     IMPRESSION  IMPRESSION:     1. Marked underventilation and perihilar vascular crowding.     Consider dedicated PA and lateral radiographs of the patient's clinical  condition permits.

## 2018-05-12 PROBLEM — R78.81 BACTEREMIA DUE TO STREPTOCOCCUS: Status: ACTIVE | Noted: 2018-05-12

## 2018-05-12 PROBLEM — B95.5 BACTEREMIA DUE TO STREPTOCOCCUS: Status: ACTIVE | Noted: 2018-05-12

## 2018-05-12 PROBLEM — R78.81 BACTEREMIA: Status: ACTIVE | Noted: 2018-05-12

## 2018-05-12 LAB
ANION GAP SERPL CALC-SCNC: 11 MMOL/L (ref 7–16)
ANION GAP SERPL CALC-SCNC: 12 MMOL/L (ref 7–16)
BASOPHILS # BLD: 0 K/UL (ref 0–0.2)
BASOPHILS NFR BLD: 0 % (ref 0–2)
BUN SERPL-MCNC: 14 MG/DL (ref 8–23)
BUN SERPL-MCNC: 16 MG/DL (ref 8–23)
CALCIUM SERPL-MCNC: 9.5 MG/DL (ref 8.3–10.4)
CALCIUM SERPL-MCNC: 9.5 MG/DL (ref 8.3–10.4)
CHLORIDE SERPL-SCNC: 109 MMOL/L (ref 98–107)
CHLORIDE SERPL-SCNC: 109 MMOL/L (ref 98–107)
CO2 SERPL-SCNC: 23 MMOL/L (ref 21–32)
CO2 SERPL-SCNC: 25 MMOL/L (ref 21–32)
CREAT SERPL-MCNC: 1.25 MG/DL (ref 0.6–1)
CREAT SERPL-MCNC: 1.34 MG/DL (ref 0.6–1)
DIFFERENTIAL METHOD BLD: ABNORMAL
EOSINOPHIL # BLD: 0 K/UL (ref 0–0.8)
EOSINOPHIL NFR BLD: 0 % (ref 0.5–7.8)
ERYTHROCYTE [DISTWIDTH] IN BLOOD BY AUTOMATED COUNT: 15.4 % (ref 11.9–14.6)
GLUCOSE SERPL-MCNC: 105 MG/DL (ref 65–100)
GLUCOSE SERPL-MCNC: 139 MG/DL (ref 65–100)
HCT VFR BLD AUTO: 38.5 % (ref 35.8–46.3)
HGB BLD-MCNC: 12.5 G/DL (ref 11.7–15.4)
IMM GRANULOCYTES # BLD: 0 K/UL (ref 0–0.5)
IMM GRANULOCYTES NFR BLD AUTO: 0 % (ref 0–5)
LIPASE SERPL-CCNC: 88 U/L (ref 73–393)
LYMPHOCYTES # BLD: 0.7 K/UL (ref 0.5–4.6)
LYMPHOCYTES NFR BLD: 6 % (ref 13–44)
MCH RBC QN AUTO: 26.7 PG (ref 26.1–32.9)
MCHC RBC AUTO-ENTMCNC: 32.5 G/DL (ref 31.4–35)
MCV RBC AUTO: 82.3 FL (ref 79.6–97.8)
MONOCYTES # BLD: 0.5 K/UL (ref 0.1–1.3)
MONOCYTES NFR BLD: 5 % (ref 4–12)
NEUTS SEG # BLD: 10.5 K/UL (ref 1.7–8.2)
NEUTS SEG NFR BLD: 89 % (ref 43–78)
PLATELET # BLD AUTO: 169 K/UL (ref 150–450)
PMV BLD AUTO: 10.9 FL (ref 10.8–14.1)
POTASSIUM SERPL-SCNC: 3.3 MMOL/L (ref 3.5–5.1)
POTASSIUM SERPL-SCNC: 4 MMOL/L (ref 3.5–5.1)
RBC # BLD AUTO: 4.68 M/UL (ref 4.05–5.25)
SODIUM SERPL-SCNC: 144 MMOL/L (ref 136–145)
SODIUM SERPL-SCNC: 145 MMOL/L (ref 136–145)
TROPONIN I SERPL-MCNC: <0.02 NG/ML (ref 0.02–0.05)
WBC # BLD AUTO: 11.8 K/UL (ref 4.3–11.1)

## 2018-05-12 PROCEDURE — 36415 COLL VENOUS BLD VENIPUNCTURE: CPT | Performed by: FAMILY MEDICINE

## 2018-05-12 PROCEDURE — 74011250637 HC RX REV CODE- 250/637: Performed by: FAMILY MEDICINE

## 2018-05-12 PROCEDURE — 65270000029 HC RM PRIVATE

## 2018-05-12 PROCEDURE — 74011250637 HC RX REV CODE- 250/637: Performed by: HOSPITALIST

## 2018-05-12 PROCEDURE — 97166 OT EVAL MOD COMPLEX 45 MIN: CPT

## 2018-05-12 PROCEDURE — 97535 SELF CARE MNGMENT TRAINING: CPT

## 2018-05-12 PROCEDURE — 85025 COMPLETE CBC W/AUTO DIFF WBC: CPT | Performed by: FAMILY MEDICINE

## 2018-05-12 PROCEDURE — 80048 BASIC METABOLIC PNL TOTAL CA: CPT | Performed by: FAMILY MEDICINE

## 2018-05-12 PROCEDURE — 99218 HC RM OBSERVATION: CPT

## 2018-05-12 PROCEDURE — 74011250636 HC RX REV CODE- 250/636: Performed by: FAMILY MEDICINE

## 2018-05-12 PROCEDURE — 97161 PT EVAL LOW COMPLEX 20 MIN: CPT

## 2018-05-12 RX ORDER — QUETIAPINE FUMARATE 25 MG/1
25 TABLET, FILM COATED ORAL ONCE
Status: COMPLETED | OUTPATIENT
Start: 2018-05-12 | End: 2018-05-12

## 2018-05-12 RX ADMIN — LORATADINE 10 MG: 10 TABLET ORAL at 08:09

## 2018-05-12 RX ADMIN — Medication 10 ML: at 05:51

## 2018-05-12 RX ADMIN — METOPROLOL TARTRATE 25 MG: 25 TABLET ORAL at 08:09

## 2018-05-12 RX ADMIN — DRONEDARONE 400 MG: 400 TABLET, FILM COATED ORAL at 08:09

## 2018-05-12 RX ADMIN — Medication 10 ML: at 21:35

## 2018-05-12 RX ADMIN — VANCOMYCIN HYDROCHLORIDE 1000 MG: 1 INJECTION, POWDER, LYOPHILIZED, FOR SOLUTION INTRAVENOUS at 03:58

## 2018-05-12 RX ADMIN — APIXABAN 2.5 MG: 2.5 TABLET, FILM COATED ORAL at 17:44

## 2018-05-12 RX ADMIN — DRONEDARONE 400 MG: 400 TABLET, FILM COATED ORAL at 17:42

## 2018-05-12 RX ADMIN — QUETIAPINE FUMARATE 25 MG: 25 TABLET ORAL at 02:35

## 2018-05-12 RX ADMIN — DONEPEZIL HYDROCHLORIDE 5 MG: 5 TABLET, FILM COATED ORAL at 21:35

## 2018-05-12 RX ADMIN — AMLODIPINE BESYLATE 5 MG: 5 TABLET ORAL at 08:09

## 2018-05-12 RX ADMIN — SODIUM CHLORIDE 75 ML/HR: 900 INJECTION, SOLUTION INTRAVENOUS at 15:20

## 2018-05-12 RX ADMIN — APIXABAN 2.5 MG: 2.5 TABLET, FILM COATED ORAL at 08:09

## 2018-05-12 NOTE — PROGRESS NOTES
Problem: Mobility Impaired (Adult and Pediatric)  Goal: *Acute Goals and Plan of Care (Insert Text)  STG:  (1.)Ms. Susan Kuhn will move from supine to sit and sit to supine , scoot up and down and roll side to side with MINIMAL ASSIST within 3 treatment day(s). (2.)Ms. Susan Kuhn will transfer from bed to chair and chair to bed with MINIMAL ASSIST using the least restrictive device within 3 treatment day(s). (3.)Ms. Susan Kuhn will ambulate with MINIMAL ASSIST for 40 feet with the least restrictive device within 3 treatment day(s). LTG:  (1.)Ms. Susan Kuhn will move from supine to sit and sit to supine , scoot up and down and roll side to side in bed with STAND BY ASSIST within 7 treatment day(s). (2.)Ms. Susan Kuhn will transfer from bed to chair and chair to bed with STAND BY ASSIST using the least restrictive device within 7 treatment day(s). (3.)Ms. Susan Kuhn will ambulate with STAND BY ASSIST for 75 feet with the least restrictive device within 7 treatment day(s). ________________________________________________________________________________________________      PHYSICAL THERAPY: Initial Assessment, PM 5/12/2018  INPATIENT: Hospital Day: 2  Payor: [employer] C[employer] PLUS / [place] [employer] [place]US / Product Type: [employer] Medicare /      NAME/AGE/GENDER: Ronny Leavitt is a 80 y.o. female   PRIMARY DIAGNOSIS: Weakness  Bacteremia Bacteremia due to Streptococcus Bacteremia due to Streptococcus        ICD-10: Treatment Diagnosis:    · Generalized Muscle Weakness (M62.81)  · Difficulty in walking, Not elsewhere classified (R26.2)  · Other abnormalities of gait and mobility (R26.89)   Precaution/Allergies:  Lipitor [atorvastatin] and Pcn [penicillins]      ASSESSMENT:     Ms. Susan Kuhn is a 80 y.o. female in the hospital for the above who was supine in bed upon arrival.  Pt appears to have some confusion as she was somewhat disoriented and her daughter provided much of the history.   Pt reports that she lives in a one story house alone that has 2 steps to enter. Her daughter reported that PTA she was getting some assistance from home care staff (8hr/day) with ADLs and ambulated with a cane. Pt admitted to no recent falls in the past year. Ms. Makenna Acosta presents to PT with generally decreased AROM and  strength in B LEs. During evaluation pt performed bed mobility with min-mod assist reporting increased pain in joints with movement. Pt appears fearful of pain and tenses up before mobilized making it more difficult to move her. She was able to stand with RW and mod-maxA. Her standing balance is poor and pt required modA to perform stand pivot transfer to bedside chair with RW.  Ms. Makenna Acosta could benefit from skilled PT as they are currently functioning below their baseline. This section established at most recent assessment   PROBLEM LIST (Impairments causing functional limitations):  1. Decreased Strength  2. Decreased ADL/Functional Activities  3. Decreased Transfer Abilities  4. Decreased Ambulation Ability/Technique  5. Decreased Balance  6. Decreased Activity Tolerance  7. Decreased Cognition   INTERVENTIONS PLANNED: (Benefits and precautions of physical therapy have been discussed with the patient.)  1. Balance Exercise  2. Bed Mobility  3. Family Education  4. Gait Training  5. Therapeutic Activites  6. Therapeutic Exercise/Strengthening  7. Transfer Training  8. Group Therapy     TREATMENT PLAN: Frequency/Duration: 3 times a week for duration of hospital stay  Rehabilitation Potential For Stated Goals: Good     RECOMMENDED REHABILITATION/EQUIPMENT: (at time of discharge pending progress): Due to the probability of continued deficits (see above) this patient will likely need continued skilled physical therapy after discharge.   Equipment:    Walkers, Type: Rolling Walker              HISTORY:   History of Present Injury/Illness (Reason for Referral):  Per H&P: \"  HISTORY OF PRESENT ILLNESS: Bernadette Shah is an 80 y.o. female with a past medical history of atrial fibrillation, HTN, and hx of DVT/PE who presents to the ER with her daughter with complaint of weakness and shortness of breath over the past 2-3 days. The patient is unable to give much more information, mostly mumbling \"yes\" or \"no. \" She denies any pain except in her knees. The patient lives alone, but her daughter lives in [facility] and checks in on her frequently. Earlier today, the patient slid down into her floor and could not get up, prompting an EMS call. She still feels very weak and requires assistance sitting up in bed. Denies any fevers, chills, nausea, vomiting, diarrhea, constipation, abdominal pain, chest pain. Denies cough.     REVIEW OF SYSTEMS: Comprehensive ROS performed and negative except as stated in HPI. \"  Past Medical History/Comorbidities:   Ms. Arnulfo Waggoner  has a past medical history of Anemia (4/21/2016); Anemia due to blood loss, acute (2014); Arthritis; Atrial fibrillation ([facility] (4/21/2016); Cancer [facility]) (Jan 2002); DVT (deep venous thrombosis) ([facility] [address] (7/2014); Dyspnea on exertion; Edema; Environmental allergies; GERD (gastroesophageal reflux disease); HLD (hyperlipidemia) (4/21/2016); Hyperlipidemia; Hypertension; Hypokalemia (2014); Incisional hernia; Low serum sodium (2014); Melena (2014); PE (pulmonary embolism) (7/2014); Pulmonary HTN ([facility] [address] Tricuspid regurgitation; and Vitamin D deficiency. Ms. Arnulfo Waggoner  has a past surgical history that includes hx bipin and bso (1971); hx urological (2002); and hx hernia repair (6/15/12).   Social History/Living Environment:   Home Environment: Private residence  # Steps to Enter: 2  Rails to Enter: Yes  Hand Rails : Right  One/Two Story Residence: One story  Living Alone: Yes  Support Systems: Child(claudia), Home care staff  Patient Expects to be Discharged to[de-identified] Unknown  Current DME Used/Available at Home: Cane, quad, Jong Marjan, straight, Shower chair  Tub or Shower Type: Tub/Shower combination  Prior Level of Function/Work/Activity:  Lives alone with home care staff in 8 hr/day weekdays. Gets some assistance for bathing and ambulates with a cane. Number of Personal Factors/Comorbidities that affect the Plan of Care:  Lives alone 1-2: MODERATE COMPLEXITY   EXAMINATION:   Most Recent Physical Functioning:   Gross Assessment:  AROM: Generally decreased, functional  Strength: Generally decreased, functional               Posture:  Posture (WDL): Exceptions to WDL  Posture Assessment: Rounded shoulders  Balance:  Sitting: Impaired  Sitting - Static: Prop sitting  Sitting - Dynamic: Prop sitting  Standing: Impaired  Standing - Static: Poor  Standing - Dynamic : Poor Bed Mobility:  Supine to Sit: Minimum assistance; Moderate assistance  Scooting: Moderate assistance  Wheelchair Mobility:     Transfers:  Sit to Stand: Moderate assistance;Maximum assistance  Stand to Sit: Minimum assistance  Bed to Chair: Moderate assistance  Gait:            Body Structures Involved:  1. Metabolic  2. Endocrine  3. Muscles Body Functions Affected:  1. Mental  2. Neuromusculoskeletal  3. Movement Related  4. Metobolic/Endocrine Activities and Participation Affected:  1. General Tasks and Demands  2. Mobility  3. Self Care  4. Domestic Life  5. Community, Social and [place]   Number of elements that affect the Plan of Care: 4+: HIGH COMPLEXITY   CLINICAL PRESENTATION:   Presentation: Stable and uncomplicated: LOW COMPLEXITY   CLINICAL DECISION MAKIN [facility] Mobility Inpatient Short Form  How much difficulty does the patient currently have. .. Unable A Lot A Little None   1. Turning over in bed (including adjusting bedclothes, sheets and blankets)? [] 1   [] 2   [] 3   [x] 4   2. Sitting down on and standing up from a chair with arms ( e.g., wheelchair, bedside commode, etc.)   [] 1   [x] 2   [] 3   [] 4   3. Moving from lying on back to sitting on the side of the bed? [] 1   [x] 2   [] 3   [] 4   How much help from another person does the patient currently need. .. Total A Lot A Little None   4. Moving to and from a bed to a chair (including a wheelchair)? [] 1   [x] 2   [] 3   [] 4   5. Need to walk in hospital room? [] 1   [x] 2   [] 3   [] 4   6. Climbing 3-5 steps with a railing? [x] 1   [] 2   [] 3   [] 4   © 2007, Trustees of [address] [place] [address] [address] [id number], under license to [employer]. All rights reserved      Score:  Initial: 13 Most Recent: X (Date: -- )    Interpretation of Tool:  Represents activities that are increasingly more difficult (i.e. Bed mobility, Transfers, Gait). Score 24 23 22-20 19-15 14-10 9-7 6     Modifier CH CI CJ CK CL CM CN      ? Mobility - Walking and Moving Around:     - CURRENT STATUS: CL - 60%-79% impaired, limited or restricted    - GOAL STATUS: CK - 40%-59% impaired, limited or restricted    - D/C STATUS:  ---------------To be determined---------------  Payor: [employer] VIP CARE PLUS [place] [employer] [place] / Product Type: Managed Care Medicare /      Medical Necessity:     · Patient demonstrates good rehab potential due to higher previous functional level. Reason for Services/Other Comments:  · Patient continues to require skilled intervention due to decreased balance and functional mobility.    Use of outcome tool(s) and clinical judgement create a POC that gives a: Clear prediction of patient's progress: LOW COMPLEXITY            TREATMENT:   (In addition to Assessment/Re-Assessment sessions the following treatments were rendered)   Pre-treatment Symptoms/Complaints:  R knee pain  Pain: Initial:   Pain Intensity 1: 4  Pain Location 1: Knee  Pain Orientation 1: Right  Post Session:  No complaints     Assessment/Reassessment only, no treatment provided today    Braces/Orthotics/Lines/Etc:   · IV  · O2 Device: Room air  Treatment/Session Assessment:    · Response to Treatment:  Tolerated fairly given increased pain with mobility. · Interdisciplinary Collaboration:   o Physical Therapist  o Occupational Therapist  o Registered Nurse  · After treatment position/precautions:   o Up in chair  o Bed alarm/tab alert on  o Bed/Chair-wheels locked  o Call light within reach  o Family at bedside   · Compliance with Program/Exercises: Will assess as treatment progresses. · Recommendations/Intent for next treatment session: \"Next visit will focus on advancements to more challenging activities and reduction in assistance provided\".   Total Treatment Duration:  PT Patient Time In/Time Out  Time In: 1235  Time Out: [address] Hugo Singer, PT, DPT

## 2018-05-12 NOTE — H&P
HOSPITALIST INITIAL HISTORY AND PHYSICAL    NAME:  Alessandro Farrell   Age:  80 y.o.  :   1931   MRN:   [id number]  PCP: Renata Quintanilla MD  Consulting MD:  Treatment Team: Primary Nurse: Kenton Sanchez; Primary Nurse: Ken Manning RN    CHIEF COMPLAINT: weakness    HISTORY OF PRESENT ILLNESS:   Alessandro Farrell is an 80 y.o. female with a past medical history of atrial fibrillation, HTN, and hx of DVT/PE who presents to the ER with her daughter with complaint of weakness and shortness of breath over the past 2-3 days. The patient is unable to give much more information, mostly mumbling \"yes\" or \"no. \" She denies any pain except in her knees. The patient lives alone, but her daughter lives in [place] and checks in on her frequently. Earlier today, the patient slid down into her floor and could not get up, prompting an EMS call. She still feels very weak and requires assistance sitting up in bed. Denies any fevers, chills, nausea, vomiting, diarrhea, constipation, abdominal pain, chest pain. Denies cough. REVIEW OF SYSTEMS: Comprehensive ROS performed and negative except as stated in HPI. Past Medical History:   Diagnosis Date    Anemia 2016    Anemia due to blood loss, acute     Arthritis     osteo    Atrial fibrillation ([address] 2016    Cancer ([address] 2002    left renal (nephrectomy) and cervical (ROSANNA, BSO)  also had chemo and radiation    DVT (deep venous thrombosis) ([facility] [address] 2014    Dyspnea on exertion     started .   No associated chest pain    Edema     Environmental allergies     controlled with occasional loratadine    GERD (gastroesophageal reflux disease)     controlled with prn Ranitidine    HLD (hyperlipidemia) 2016    Hyperlipidemia     Hypertension     controlled with medication    Hypokalemia     Incisional hernia     Low serum sodium     Melena     PE (pulmonary embolism) 2014    Pulmonary HTN (HCC)     Tricuspid regurgitation     Mild to Moderate    Vitamin D deficiency         Past Surgical History:   Procedure Laterality Date    HX HERNIA REPAIR  6/15/12    Dr. Nathaniel Oakley HX UROLOGICAL  2002    left nephrectomy       Prior to Admission Medications   Prescriptions Last Dose Informant Patient Reported? Taking? amLODIPine (NORVASC) 5 mg tablet   Yes No   Sig: Take 5 mg by mouth daily. apixaban (ELIQUIS) 2.5 mg tablet   No No   Sig: Take 1 Tab by mouth two (2) times a day. donepezil (ARICEPT) 5 mg tablet   Yes No   Sig: Take  by mouth nightly. dronedarone (MULTAQ) tab tablet   No No   Sig: Take 1 Tab by mouth two (2) times daily (with meals). loratadine (CLARITIN) 10 mg tablet   Yes No   Sig: Take 10 mg by mouth daily. metoprolol tartrate (LOPRESSOR) 25 mg tablet   No No   Sig: Take 1 Tab by mouth two (2) times a day. multivitamin (ONE A DAY) tablet   Yes No   Sig: Take 1 Tab by mouth daily. Facility-Administered Medications: None       Allergies   Allergen Reactions    Lipitor [Atorvastatin] Unable to Obtain    Pcn [Penicillins] Rash     With peeling skin       FAMILY HISTORY: Reviewed.  Negative except   Family History   Problem Relation Age of Onset    Cancer Mother     Hypertension Mother     Stroke Father     Heart Disease Father        Social History   Substance Use Topics    Smoking status: Former Smoker     Packs/day: 0.50     Years: 10.00     Types: Cigarettes     Quit date: 2000    Smokeless tobacco: Never Used      Comment: Stopped tobacco use 14 years ago    Alcohol use No         Objective:     Visit Vitals    /60    Pulse 98    Temp 99.8 °F (37.7 °C)    Resp 18    Ht 5' 2\" (1.575 m)    Wt 69.4 kg (153 lb)    SpO2 97%    BMI 27.98 kg/m2      Temp (24hrs), Av.3 °F (37.4 °C), Min:97.8 °F (36.6 °C), Max:100.4 °F (38 °C)    Oxygen Therapy  O2 Sat (%): 97 % (18)  Pulse via Oximetry: 79 beats per minute (18)  O2 Device: Room air (05/11/18 1140)  Physical Exam:  General:    The patient is a pleasant elderly in no acute distress. Head:   Normocephalic/atraumatic. Eyes:  No palpebral pallor or scleral icterus. ENT:  External auricular and nasal exam within normal limits. Mucous membranes are moist.  Neck:  Supple, non-tender, no JVD. Lungs:   Clear to auscultation bilaterally without wheezes or crackles. No respiratory distress or accessory muscle use. Heart:   Regular rate and rhythm, without murmurs, rubs, or gallops. Abdomen:   Soft, non-tender, non-distended with normoactive bowel sounds. Genitourinary: No tenderness over the bladder or bilateral CVAs. Extremities: Without clubbing, cyanosis, or edema. Skin:     Normal color, texture, and turgor. No rashes, lesions, or jaundice. Pulses: Radial and dorsalis pedis pulses present 2+ bilaterally. Capillary refill <2s. Neurologic: CN II-XII grossly intact and symmetrical.     Moving all four extremities well with no focal deficits. Global weakness of extremities. Psychiatric: Pleasant demeanor, appropriate affect. Alert, disoriented. Data Review:   Recent Results (from the past 24 hour(s))   CBC WITH AUTOMATED DIFF    Collection Time: 05/11/18 11:54 AM   Result Value Ref Range    WBC 12.1 (H) 4.3 - 11.1 K/uL    RBC 4.92 4.05 - 5.25 M/uL    HGB 13.2 11.7 - 15.4 g/dL    HCT 40.9 35.8 - 46.3 %    MCV 83.1 79.6 - 97.8 FL    MCH 26.8 26.1 - 32.9 PG    MCHC 32.3 31.4 - 35.0 g/dL    RDW 15.4 (H) 11.9 - 14.6 %    PLATELET 503 933 - 442 K/uL    MPV 11.5 10.8 - 14.1 FL    DF AUTOMATED      NEUTROPHILS 91 (H) 43 - 78 %    LYMPHOCYTES 8 (L) 13 - 44 %    MONOCYTES 1 (L) 4.0 - 12.0 %    EOSINOPHILS 0 (L) 0.5 - 7.8 %    BASOPHILS 0 0.0 - 2.0 %    IMMATURE GRANULOCYTES 0 0.0 - 5.0 %    ABS. NEUTROPHILS 11.0 (H) 1.7 - 8.2 K/UL    ABS. LYMPHOCYTES 1.0 0.5 - 4.6 K/UL    ABS. MONOCYTES 0.1 0.1 - 1.3 K/UL    ABS. EOSINOPHILS 0.0 0.0 - 0.8 K/UL    ABS.  BASOPHILS 0.0 0.0 - 0.2 K/UL    ABS. IMM. GRANS. 0.0 0.0 - 0.5 K/UL   METABOLIC PANEL, COMPREHENSIVE    Collection Time: 05/11/18 11:54 AM   Result Value Ref Range    Sodium 143 136 - 145 mmol/L    Potassium 4.8 3.5 - 5.1 mmol/L    Chloride 109 (H) 98 - 107 mmol/L    CO2 22 21 - 32 mmol/L    Anion gap 12 7 - 16 mmol/L    Glucose 165 (H) 65 - 100 mg/dL    BUN 21 8 - 23 MG/DL    Creatinine 1.59 (H) 0.6 - 1.0 MG/DL    GFR est AA 40 (L) >60 ml/min/1.73m2    GFR est non-AA 33 (L) >60 ml/min/1.73m2    Calcium 9.7 8.3 - 10.4 MG/DL    Bilirubin, total 1.3 (H) 0.2 - 1.1 MG/DL    ALT (SGPT) 22 12 - 65 U/L    AST (SGOT) 31 15 - 37 U/L    Alk. phosphatase 77 50 - 136 U/L    Protein, total 7.7 6.3 - 8.2 g/dL    Albumin 3.0 (L) 3.2 - 4.6 g/dL    Globulin 4.7 (H) 2.3 - 3.5 g/dL    A-G Ratio 0.6 (L) 1.2 - 3.5     CK    Collection Time: 05/11/18 11:54 AM   Result Value Ref Range     (H) 21 - 215 U/L   URINE MICROSCOPIC    Collection Time: 05/11/18  1:17 PM   Result Value Ref Range    WBC 0-3 0 /hpf    RBC 0-3 0 /hpf    Epithelial cells 0-3 0 /hpf    Bacteria 0 0 /hpf    Casts 0-3 0 /lpf       Imaging Benoit Can /Studies:  Xr Chest Sngl V    Result Date: 5/11/2018  IMPRESSION: 1. Marked underventilation and perihilar vascular crowding. Consider dedicated PA and lateral radiographs of the patient's clinical condition permits. Assessment and Plan:     Principal Problem:    Weakness (6/79/7993)    Uncertain etiology. Pt had fever of 100.4, urine unremarkable. CXR indeterminate. Will get PA/Lat CXR, blood cultures. Will give IVF. PT/OT consult. Active Problems:    HTN (hypertension) (7/30/2014)    Stable. Continue home meds. Leukocytosis (3/42/1118)    Uncertain etiology, follow up repeat CXR per above. CKD (chronic kidney disease) stage 5, GFR less than 15 ml/min ([facility]) (7/9/2015)    Stable. Follow BMP. Pulmonary embolism ([facility] (7/30/2014)    Stable, continue Eliquis.       DVT (deep venous thrombosis) ([address] (8/1/2014) Per above. Atrial fibrillation ([facility] (4/21/2016)    Stable, rate controlled. Continue Eliquis. Pulmonary hypertension ([facility] (8/4/2014)    Stable. HLD (hyperlipidemia) (4/21/2016)    Stable, continue home meds. DVT Prophylaxis: Eliquis. Code Status: FULL CODE      Disposition: Observe on medical for evaluation and treatment as per above. Anticipated discharge: < 2 midnights     Signed By: Margarette Rice MD     May 11, 2018      Addendum:    Blood culture positive for GPC. Will start Vancomycin IV.

## 2018-05-12 NOTE — PROGRESS NOTES
TRANSFER - IN REPORT:    Verbal report received from Joe (name) on [facility]  being received from ED(unit) for routine progression of care      Report consisted of patients Situation, Background, Assessment and   Recommendations(SBAR). Information from the following report(s) SBAR, Kardex and MAR was reviewed with the receiving nurse. Opportunity for questions and clarification was provided. Assessment completed upon patients arrival to unit and care assumed.

## 2018-05-12 NOTE — PROGRESS NOTES
Patient is resting quietly in bed with eyes closed. Respirations are even and unlabored. Mittens placed on both arms. IV fluid infusing. No distress at this time. Bed is low and  locked.  [place] alarm is on place

## 2018-05-12 NOTE — PROGRESS NOTES
Patient arrived room 832 via stretcher accompanied by transport. Dual skin assessment completed with Francesca Mitchell. Patient is alert but confused. Oriented to name and . Skin is intact , no tear or wound. Tracing edema noted on left foot. Bed is low, locked and side rails 3x.

## 2018-05-12 NOTE — PROGRESS NOTES
RN spoke with Daughter Mukul Quintero she is requesting to have patient out of bed. RN contacted Tegan Carlson new verbal order for Physical Therapy to see patient.

## 2018-05-12 NOTE — PROGRESS NOTES
Problem: Self Care Deficits Care Plan (Adult)  Goal: *Acute Goals and Plan of Care (Insert Text)  1. Patient will complete upper body bathing and dressing with minimal assistance and adaptive equipment as needed. 2. Patient will complete toileting with moderate assistance. 3. Patient will tolerate 25 minutes of OT treatment with 3-4 rest breaks to increase activity tolerance for ADLs. 4. Patient will complete functional transfers with minimal assistance and adaptive equipment as needed. Timeframe: 7 visits       OCCUPATIONAL THERAPY: Initial Assessment, Treatment Day: 1st and PM 5/12/2018  INPATIENT: Hospital Day: 2  Payor: [employer] VIP CARE PLUS / [place] [employer] VIP [employer] [place]US / Product Type: Managed Care Medicare /      NAME/AGE/GENDER: Fran Prado is a 80 y.o. female   PRIMARY DIAGNOSIS:  Weakness  Bacteremia Bacteremia due to Streptococcus Bacteremia due to Streptococcus        ICD-10: Treatment Diagnosis:    · Pain in Left Knee (M25.562)  · Generalized Muscle Weakness (M62.81)  · Other lack of cordination (R27.8)  · History of falling (Z91.81)   Precautions/Allergies:     Lipitor [atorvastatin] and Pcn [penicillins]      ASSESSMENT:     [facility] with bacteremia and weakness. Pt lives alone with caregivers coming for 6 hours daily M-F and 2 hours on the weekend. Pt was sleeping in the chair with daughter at bedside. Pt's daughter provided hx. Pt was easily aroused in chair and states she needs to use the bathroom. Pt is alert but is confused. Pt needed maximal assistance to stand from chair and transfer to the [facility]. Pt was fearful of falling and needed cues for safety and hand placement when transferring. Pt able to void but needed total assistance to wipe. Called in assistant to help with pulling up brief. Pt very fatigued after standing and transfer to [facility]. Pt transferred from [facility] to bed and returned to supine.  Pt c/o R knee pain with hx of arthritis which limited her with transfer as well as her fear for falling. Pt is very limited with functional transfers and ADL and functioning well below her baseline. Pt will benefit from OT services to address stated goals and plan of care. This section established at most recent assessment   PROBLEM LIST (Impairments causing functional limitations):  1. Decreased Strength  2. Decreased ADL/Functional Activities  3. Decreased Transfer Abilities  4. Decreased Ambulation Ability/Technique  5. Decreased Balance  6. Increased Pain  7. Decreased Activity Tolerance  8. Increased Fatigue  9. Decreased Flexibility/Joint Mobility  10. Decreased [place] with Home Exercise Program  11. Decreased Cognition   INTERVENTIONS PLANNED: (Benefits and precautions of occupational therapy have been discussed with the patient.)  1. Activities of daily living training  2. Adaptive equipment training  3. Balance training  4. Clothing management  5. Cognitive training  6. Donning&doffing training  7. Group therapy  8. Neuromuscular re-eduation  9. Therapeutic activity  10. Therapeutic exercise     TREATMENT PLAN: Frequency/Duration: Follow patient 3 times per week to address above goals. Rehabilitation Potential For Stated Goals: Good     RECOMMENDED REHABILITATION/EQUIPMENT: (at time of discharge pending progress): Due to the probability of continued deficits (see above) this patient will likely need continued skilled occupational therapy after discharge. Equipment:    TBD              OCCUPATIONAL PROFILE AND HISTORY:   History of Present Injury/Illness (Reason for Referral):  See H&P  Past Medical History/Comorbidities:   Ms. Sumeet Barnett  has a past medical history of Anemia (4/21/2016); Anemia due to blood loss, acute (2014); Arthritis; Atrial fibrillation ([facility] (4/21/2016); Cancer [facility]) (Jan 2002); DVT (deep venous thrombosis) ([facility] [address] (7/2014); Dyspnea on exertion; Edema; Environmental allergies; GERD (gastroesophageal reflux disease);  HLD (hyperlipidemia) (4/21/2016); Hyperlipidemia; Hypertension; Hypokalemia (2014); Incisional hernia; Low serum sodium (2014); Melena (2014); PE (pulmonary embolism) (7/2014); Pulmonary HTN ([facility] Tricuspid regurgitation; and Vitamin D deficiency. Ms. Mo Manuel  has a past surgical history that includes hx bipin and bso (1971); hx urological (2002); and hx hernia repair (6/15/12). Social History/Living Environment:   Home Environment: Private residence  # Steps to Enter: 2  Rails to Enter: Yes  Hand Rails : Right  One/Two Story Residence: One story  Living Alone: Yes  Support Systems: Child(claudia), Home care staff  Patient Expects to be Discharged to[de-identified] Unknown  Current DME Used/Available at Home: Cane, quad, Alexander Bernstein, straight, Shower chair  Tub or Shower Type: Tub/Shower combination  Prior Level of Function/Work/Activity:  Pt lives at home alone with caregivers for 4 hours in the morning and 2 hours in the evening M-F. Sat and Sunday pt has caregiver for 2 hours in the evening. Pt was using a cane for functional mobility. Pt was still able to get to the bathroom on her own. Caregivers were assisting her with ADL. Pt 's daughter checks in on her as well. Personal Factors:          Age:  80 y.o. Social Background:  Lives alone. Past/Current Experience:  Hx of lowering/falling to the floor        Other factors that influence how disability is experienced by the patient:  Multiple co-morbidities    Number of Personal Factors/Comorbidities that affect the Plan of Care: Extensive review of physical, cognitive, and psychosocial performance (3+):  HIGH COMPLEXITY   ASSESSMENT OF OCCUPATIONAL PERFORMANCE[de-identified]   Activities of Daily Living:           Basic ADLs (From Assessment) Complex ADLs (From Assessment)   Feeding: Minimum assistance  Oral Facial Hygiene/Grooming: Moderate assistance  Bathing: Maximum assistance  Upper Body Dressing: Moderate assistance  Lower Body Dressing: Total assistance  Toileting:  Total assistance Instrumental ADL  Meal Preparation: Total assistance  Homemaking: Total assistance   Grooming/Bathing/Dressing Activities of Daily Living     Cognitive Retraining  Safety/Judgement: Decreased insight into deficits; Fall prevention;Home safety                 Functional Transfers  Toilet Transfer : Maximum assistance;Assist x1;Assist x2     Bed/Mat Mobility  Supine to Sit: Minimum assistance; Moderate assistance  Sit to Supine: Maximum assistance;Assist x2  Sit to Stand: Maximum assistance  Bed to Chair: Maximum assistance;Assist x2  Scooting: Moderate assistance       Most Recent Physical Functioning:   Gross Assessment:  AROM: Generally decreased, functional  Strength: Generally decreased, functional               Posture:  Posture (WDL): Exceptions to WDL  Posture Assessment: Rounded shoulders  Balance:  Sitting: Impaired  Sitting - Static: Prop sitting  Sitting - Dynamic: Fair (occasional)  Standing: Impaired  Standing - Static: Poor  Standing - Dynamic : Poor Bed Mobility:  Supine to Sit: Minimum assistance; Moderate assistance  Sit to Supine: Maximum assistance;Assist x2  Scooting: Moderate assistance  Wheelchair Mobility:     Transfers:  Sit to Stand: Maximum assistance  Stand to Sit: Maximum assistance  Bed to Chair: Maximum assistance;Assist x2              Patient Vitals for the past 6 hrs:   BP BP Patient Position SpO2 Pulse   05/12/18 1136 125/65 At rest 95 % 60   05/12/18 1510 106/67 At rest 96 % 64       Mental Status  Neurologic State: Confused  Orientation Level: Disoriented to place, Disoriented to time, Oriented to place  Cognition: Decreased attention/concentration, Decreased command following, Impaired decision making, Memory loss, Poor safety awareness  Perception: Appears intact  Perseveration: No perseveration noted  Safety/Judgement: Decreased insight into deficits, Fall prevention, Home safety                          Physical Skills Involved:  1.  Range of Motion  2. Balance  3. Strength  4. Activity Tolerance  5. Gross Motor Control  6. Pain (acute)  7. Pain (Chronic)  8. Skin Integrity Cognitive Skills Affected (resulting in the inability to perform in a timely and safe manner):  1. Executive Function  2. Short Term Recall  3. Divided Attention Psychosocial Skills Affected:  1. Habits/Routines  2. Environmental Adaptation  3. Self-Awareness   Number of elements that affect the Plan of Care: 5+:  HIGH COMPLEXITY   CLINICAL DECISION MAKIN[address] [place] [address] AM-PAC 6 Clicks   Daily Activity Inpatient Short Form  How much help from another person does the patient currently need. .. Total A Lot A Little None   1. Putting on and taking off regular lower body clothing? [x] 1   [] 2   [] 3   [] 4   2. Bathing (including washing, rinsing, drying)? [] 1   [x] 2   [] 3   [] 4   3. Toileting, which includes using toilet, bedpan or urinal?   [x] 1   [] 2   [] 3   [] 4   4. Putting on and taking off regular upper body clothing? [] 1   [x] 2   [] 3   [] 4   5. Taking care of personal grooming such as brushing teeth? [] 1   [x] 2   [] 3   [] 4   6. Eating meals? [] 1   [] 2   [x] 3   [] 4   © , Trustees of [address] [place] [address], under license to [employer]. All rights reserved      Score:  Initial: 11 Most Recent: X (Date: -- )    Interpretation of Tool:  Represents activities that are increasingly more difficult (i.e. Bed mobility, Transfers, Gait). Score 24 23 22-20 19-15 14-10 9-7 6     Modifier CH CI CJ CK CL CM CN      ?  Self Care:     - CURRENT STATUS: CL - 60%-79% impaired, limited or restricted    - GOAL STATUS: CK - 40%-59% impaired, limited or restricted    - D/C STATUS:  ---------------To be determined---------------  Payor: FIRST [employer] VIP CARE PLUS / [place] [employer] CHO[employer] VIP CARE PLUS / Product Type: Managed Care Medicare /      Medical Necessity:     · Patient demonstrates good rehab potential due to higher previous functional level. Reason for Services/Other Comments:  · Patient continues to require skilled intervention due to decreased independence with ADL/functional transfers. Use of outcome tool(s) and clinical judgement create a POC that gives a: MODERATE COMPLEXITY         TREATMENT:   (In addition to Assessment/Re-Assessment sessions the following treatments were rendered)     Pre-treatment Symptoms/Complaints:    Pain: Initial:   Pain Intensity 1: 4  Pain Location 1: Knee  Pain Orientation 1: Right  Pain Intervention(s) 1: Repositioned  Post Session:  same     Self Care: (15): Procedure(s) (per grid) utilized to improve and/or restore self-care/home management as related to toileting. Required maximal visual, verbal, manual and tactile cueing to facilitate activities of daily living skills and compensatory activities. Braces/Orthotics/Lines/Etc:   · IV  · O2 Device: Room air  Treatment/Session Assessment:    · Response to Treatment:  Pt tolerated with fatigue and needing a lot of assistance. Fearful of falling. · Interdisciplinary Collaboration:   o Occupational Therapist  o Registered Nurse  o Certified Nursing Assistant/Patient Care Technician  · After treatment position/precautions:   o Supine in bed  o Bed alarm/tab alert on  o Bed/Chair-wheels locked  o Call light within reach  o RN notified   · Compliance with Program/Exercises: Will assess as treatment progresses. · Recommendations/Intent for next treatment session: \"Next visit will focus on advancements to more challenging activities and reduction in assistance provided\".   Total Treatment Duration:  OT Patient Time In/Time Out  Time In: 1432  Time Out: 2018 [facility], OT

## 2018-05-12 NOTE — PROGRESS NOTES
RN received patient in stable condition. Patient is alert to name aware she in the hospital. Patient agitated. Patient orientated to date and time. Patient co having to urinate and incontinent urine x1. Patient placed on the bedpan and void small amount. patient requires assistance turning. Patient weak . Patient took medications  without difficulty. patient  Tolerated diet and fluids appetite fair. Denies co n/v. Trace edema bilateral lower extremity. RN provided emotional support and explained care and orientated her. Patient agitation decreased. Call light with in reach. RN will continue to monitor.

## 2018-05-12 NOTE — PROGRESS NOTES
Pharmacokinetic Consult to Pharmacist    Sakshi Bertha is a 80 y.o. female being treated for possible beta hemolytic strep bacteremia with Vancomycin. Of note, the patient has a PCN allergy listed as \"rash/skin peeling\". Height: 5' 2\" (157.5 cm)  Weight: 66.1 kg (145 lb 12.8 oz)  Lab Results   Component Value Date/Time    BUN 16 05/11/2018 11:07 PM    Creatinine 1.34 (H) 05/11/2018 11:07 PM    WBC 12.8 (H) 05/11/2018 11:07 PM    Lactic Acid (POC) 2.4 (H) 08/07/2017 03:04 PM      Estimated Creatinine Clearance: 26.9 mL/min (based on Cr of 1.34). CULTURES:  All Micro Results     Procedure Component Value Units Date/Time    CULTURE, BLOOD [[id number]]  (Abnormal) Collected:  05/11/18 1259    Order Status:  Completed Specimen:  Blood from Blood Updated:  05/12/18 0637     Special Requests: --        RIGHT  Antecubital       GRAM STAIN GRAM POSITIVE COCCI                 AEROBIC AND ANAEROBIC BOTTLES              CRITICAL RESULT NOT CALLED DUE TO PREVIOUS NOTIFICATION OF CRITICAL RESULT WITHIN THE LAST 24 HOURS. Culture result:         STREPTOCOCCI, BETA HEMOLYTIC (A)            For Susceptibility Refer to Culture  ACCESSION [id number]      CULTURE, BLOOD [[id number]]  (Abnormal) Collected:  05/11/18 1259    Order Status:  Completed Specimen:  Blood from Blood Updated:  05/12/18 0636     Special Requests: --        LEFT  Antecubital       GRAM STAIN GRAM POSITIVE COCCI                 AEROBIC AND ANAEROBIC BOTTLES              RESULTS VERIFIED, PHONED TO AND READ BACK BY Minnie Chacko RN ON 05/11/2018 AT 2242 WMR     Culture result:         STREPTOCOCCI, BETA HEMOLYTIC IDENTIFICATION AND SUSCEPTIBILITY TO FOLLOW (A)              Day 1 of vancomycin. Goal trough is 15-20. Vancomycin dose initiated at 1000 mg Q24H due to CKD. Plan trough prior to the 3rd dose due to CKD. Will continue to follow patient.       Thank you,  Elisa Randolph, PharmD  Clinical Pharmacist  [phone]

## 2018-05-12 NOTE — ED NOTES
TRANSFER - OUT REPORT:    Verbal report given to Anahi Ghotra RN on Clarissa Loss  being transferred to [id number] for routine progression of care       Report consisted of patients Situation, Background, Assessment and   Recommendations(SBAR). Information from the following report(s) ED Summary was reviewed with the receiving nurse. Opportunity for questions and clarification was provided.       Patient transported with:  Transport tech  Personal belongings  Patient's daughter

## 2018-05-12 NOTE — PROGRESS NOTES
Problem: Nutrition Deficit  Goal: *Optimize nutritional status  Nutrition  Reason for assessment: Referral received from nursing admission Malnutrition Screening Tool for recently lost unsure amount of weight without trying and eating poorly due to decreased appetite. Assessment:   Diet order(s): regular with ensure enlive BID and HS snack  Food/Nutrition Patient History:  Pt sleeping during RD visit. I spoke with her daughter at bedside. She states that she has noticed weight loss over the past ~4 years. She states that she was hospitalized four years ago and weighed >170 pounds then. She states that she has started eating a little bit less over time and is more receptive to eating small frequent meals and snacks vs meals. I observed <25% of lunch consumed. Pt's daughter states Rishi Maya usually eats better than that, I think the infection is causing her to eat poorly. \"  She did not consume supplementation at home PTA but states that she did consume it during previous hospitalization with encouragement. Anthropometrics:Height: 5' 2\" (157.5 cm),  Weight: 66.1 kg (145 lb 12.8 oz), Weight Source: Bed, Body mass index is 26.67 kg/(m^2). BMI class of normal weight for age >65 years. Edema: Trace to BLEs  WT / BMI 5/12/2018 4/12/2018 10/18/2017 8/7/2017 4/27/2017   WEIGHT 145 lb 12.8 oz 152 lb 3.2 oz 153 lb 155 lb 160 lb     WT / BMI 3/9/2017 10/27/2016   WEIGHT 161 lb 161 lb   Per weights in EMR, potential for a 7 pound, 4.6% clinically insignificant weight loss in ~1 month. Noted 15 pound, 9.3% clinically insignificant weight loss over ~1 year. Macronutrient needs:  EER:  2964-4254 kcal /day (20-25 kcal/kg listed BW)  EPR:  40-60 grams protein/day (0.8-1.2 grams/kg IBW)(GFR 52)-h/o CKD  Intake/Comparative Standards: Per RD meal rounds: 25% of lunch. No recorded meal intakes.     Nutrition Diagnosis: Inadequate oral intake r/t decreased ability to consume sufficient oral intake as evidenced by wt loss noted above and consuming <25% of lunch today. Intervention:  Meals and snacks: Continue current diet. Nutrition Supplement Therapy: continue ensure enlive TID (change to dinner vs HS snack)   Discharge nutrition plan: Too soon to determine.     Cecy Holloway [address], [address] [place] [address], [address] [place] [address], [phone]

## 2018-05-12 NOTE — PROGRESS NOTES
Patient seen by PT and OT patient tolerated . Patient requires maximal assist with transferring. IV fluids continue. Patient voiding QS. Patient sleeping most of shift. DTR at bedside. Appetite poor supplement ordered. Patient denies co pain no distress. Call light with in reach. RN will continue to monitor.

## 2018-05-12 NOTE — PROGRESS NOTES
Hospitalist Progress Note    Subjective:   Daily Progress Note: 2018 10:06 AM      No complaints. Blood cx now positive for beta hemolytic strep. On Vanc. Current Facility-Administered Medications   Medication Dose Route Frequency    vancomycin (VANCOCIN) 1,000 mg in 0.9% sodium chloride (MBP/ADV) 250 mL  1 g IntraVENous Q24H    amLODIPine (NORVASC) tablet 5 mg  5 mg Oral DAILY    apixaban (ELIQUIS) tablet 2.5 mg  2.5 mg Oral BID    donepezil (ARICEPT) tablet 5 mg  5 mg Oral QHS    dronedarone (MULTAQ) tablet tab 400 mg  400 mg Oral BID WITH MEALS    loratadine (CLARITIN) tablet 10 mg  10 mg Oral DAILY    metoprolol tartrate (LOPRESSOR) tablet 25 mg  25 mg Oral BID    sodium chloride (NS) flush 5-10 mL  5-10 mL IntraVENous Q8H    sodium chloride (NS) flush 5-10 mL  5-10 mL IntraVENous PRN    acetaminophen (TYLENOL) tablet 650 mg  650 mg Oral Q4H PRN    ondansetron (ZOFRAN) injection 4 mg  4 mg IntraVENous Q4H PRN    magnesium hydroxide (MILK OF MAGNESIA) 400 mg/5 mL oral suspension 30 mL  30 mL Oral DAILY PRN    0.9% sodium chloride infusion  75 mL/hr IntraVENous CONTINUOUS    tuberculin injection 5 Units  5 Units IntraDERMal ONCE        Review of Systems  A comprehensive review of systems was negative except for that written in the HPI.     Objective:     Visit Vitals    /79 (BP 1 Location: Right arm, BP Patient Position: At rest)    Pulse (!) 121  Comment: PT was anxious and aggitated     Temp 98.2 °F (36.8 °C)    Resp 22    Ht 5' 2\" (1.575 m)    Wt 66.1 kg (145 lb 12.8 oz)    SpO2 97%    BMI 26.67 kg/m2      O2 Device: Room air    Temp (24hrs), Av.7 °F (37.1 °C), Min:97.5 °F (36.4 °C), Max:100.4 °F (38 °C)         05/10 1901 -  0700  In: 256 [I.V.:256]  Out: -     Visit Vitals    /79 (BP 1 Location: Right arm, BP Patient Position: At rest)    Pulse (!) 121  Comment: PT was anxious and aggitated     Temp 98.2 °F (36.8 °C)    Resp 22    Ht 5' 2\" (1.575 m)    Wt 66.1 kg (145 lb 12.8 oz)    SpO2 97%    BMI 26.67 kg/m2     General appearance: alert, cooperative, no distress, appears stated age  Head: Normocephalic, without obvious abnormality, atraumatic  Eyes: negative  Throat: Lips, mucosa, and tongue normal. Teeth and gums normal  Lungs: clear to auscultation bilaterally  Heart: regular rate and rhythm, S1, S2 normal, no murmur, click, rub or gallop  Abdomen: soft, non-tender. Bowel sounds normal. No masses,  no organomegaly  Skin: Skin color, texture, turgor normal. No rashes or lesions  Neurologic: Grossly normal    Additional comments:None    Data Review    Recent Results (from the past 24 hour(s))   CBC WITH AUTOMATED DIFF    Collection Time: 05/11/18 11:54 AM   Result Value Ref Range    WBC 12.1 (H) 4.3 - 11.1 K/uL    RBC 4.92 4.05 - 5.25 M/uL    HGB 13.2 11.7 - 15.4 g/dL    HCT 40.9 35.8 - 46.3 %    MCV 83.1 79.6 - 97.8 FL    MCH 26.8 26.1 - 32.9 PG    MCHC 32.3 31.4 - 35.0 g/dL    RDW 15.4 (H) 11.9 - 14.6 %    PLATELET 153 280 - 071 K/uL    MPV 11.5 10.8 - 14.1 FL    DF AUTOMATED      NEUTROPHILS 91 (H) 43 - 78 %    LYMPHOCYTES 8 (L) 13 - 44 %    MONOCYTES 1 (L) 4.0 - 12.0 %    EOSINOPHILS 0 (L) 0.5 - 7.8 %    BASOPHILS 0 0.0 - 2.0 %    IMMATURE GRANULOCYTES 0 0.0 - 5.0 %    ABS. NEUTROPHILS 11.0 (H) 1.7 - 8.2 K/UL    ABS. LYMPHOCYTES 1.0 0.5 - 4.6 K/UL    ABS. MONOCYTES 0.1 0.1 - 1.3 K/UL    ABS. EOSINOPHILS 0.0 0.0 - 0.8 K/UL    ABS. BASOPHILS 0.0 0.0 - 0.2 K/UL    ABS. IMM.  GRANS. 0.0 0.0 - 0.5 K/UL   METABOLIC PANEL, COMPREHENSIVE    Collection Time: 05/11/18 11:54 AM   Result Value Ref Range    Sodium 143 136 - 145 mmol/L    Potassium 4.8 3.5 - 5.1 mmol/L    Chloride 109 (H) 98 - 107 mmol/L    CO2 22 21 - 32 mmol/L    Anion gap 12 7 - 16 mmol/L    Glucose 165 (H) 65 - 100 mg/dL    BUN 21 8 - 23 MG/DL    Creatinine 1.59 (H) 0.6 - 1.0 MG/DL    GFR est AA 40 (L) >60 ml/min/1.73m2    GFR est non-AA 33 (L) >60 ml/min/1.73m2    Calcium 9.7 8.3 - 10.4 MG/DL Bilirubin, total 1.3 (H) 0.2 - 1.1 MG/DL    ALT (SGPT) 22 12 - 65 U/L    AST (SGOT) 31 15 - 37 U/L    Alk. phosphatase 77 50 - 136 U/L    Protein, total 7.7 6.3 - 8.2 g/dL    Albumin 3.0 (L) 3.2 - 4.6 g/dL    Globulin 4.7 (H) 2.3 - 3.5 g/dL    A-G Ratio 0.6 (L) 1.2 - 3.5     CK    Collection Time: 05/11/18 11:54 AM   Result Value Ref Range     (H) 21 - 215 U/L   CULTURE, BLOOD    Collection Time: 05/11/18 12:59 PM   Result Value Ref Range    Special Requests: LEFT  Antecubital        GRAM STAIN GRAM POSITIVE COCCI      GRAM STAIN AEROBIC AND ANAEROBIC BOTTLES      GRAM STAIN        RESULTS VERIFIED, PHONED TO AND READ BACK BY Vinnie Bae RN ON 05/11/2018 AT 2242 WMR    Culture result: (A)       STREPTOCOCCI, BETA HEMOLYTIC IDENTIFICATION AND SUSCEPTIBILITY TO FOLLOW   CULTURE, BLOOD    Collection Time: 05/11/18 12:59 PM   Result Value Ref Range    Special Requests: RIGHT  Antecubital        GRAM STAIN GRAM POSITIVE COCCI      GRAM STAIN AEROBIC AND ANAEROBIC BOTTLES      GRAM STAIN        CRITICAL RESULT NOT CALLED DUE TO PREVIOUS NOTIFICATION OF CRITICAL RESULT WITHIN THE LAST 24 HOURS. Culture result: STREPTOCOCCI, BETA HEMOLYTIC (A)      Culture result:         For Susceptibility Refer to Culture  ACCESSION [id number]     URINE MICROSCOPIC    Collection Time: 05/11/18  1:17 PM   Result Value Ref Range    WBC 0-3 0 /hpf    RBC 0-3 0 /hpf    Epithelial cells 0-3 0 /hpf    Bacteria 0 0 /hpf    Casts 0-3 0 /lpf   LIPASE    Collection Time: 05/11/18 11:07 PM   Result Value Ref Range    Lipase 88 73 - 393 U/L   TROPONIN I    Collection Time: 05/11/18 11:07 PM   Result Value Ref Range    Troponin-I, Qt. <0.02 (L) 0.02 - 0.05 NG/ML   CBC WITH AUTOMATED DIFF    Collection Time: 05/11/18 11:07 PM   Result Value Ref Range    WBC 12.8 (H) 4.3 - 11.1 K/uL    RBC 4.95 4.05 - 5.25 M/uL    HGB 13.1 11.7 - 15.4 g/dL    HCT 41.1 35.8 - 46.3 %    MCV 83.0 79.6 - 97.8 FL    MCH 26.5 26.1 - 32.9 PG    MCHC 31.9 31.4 - 35.0 g/dL    RDW 15.3 (H) 11.9 - 14.6 %    PLATELET 191 (L) 420 - 450 K/uL    MPV 11.0 10.8 - 14.1 FL    DF AUTOMATED      NEUTROPHILS 88 (H) 43 - 78 %    LYMPHOCYTES 6 (L) 13 - 44 %    MONOCYTES 6 4.0 - 12.0 %    EOSINOPHILS 0 (L) 0.5 - 7.8 %    BASOPHILS 0 0.0 - 2.0 %    IMMATURE GRANULOCYTES 0 0.0 - 5.0 %    ABS. NEUTROPHILS 11.2 (H) 1.7 - 8.2 K/UL    ABS. LYMPHOCYTES 0.8 0.5 - 4.6 K/UL    ABS. MONOCYTES 0.7 0.1 - 1.3 K/UL    ABS. EOSINOPHILS 0.0 0.0 - 0.8 K/UL    ABS. BASOPHILS 0.0 0.0 - 0.2 K/UL    ABS. IMM.  GRANS. 0.1 0.0 - 0.5 K/UL   METABOLIC PANEL, BASIC    Collection Time: 05/11/18 11:07 PM   Result Value Ref Range    Sodium 144 136 - 145 mmol/L    Potassium 4.0 3.5 - 5.1 mmol/L    Chloride 109 (H) 98 - 107 mmol/L    CO2 23 21 - 32 mmol/L    Anion gap 12 7 - 16 mmol/L    Glucose 105 (H) 65 - 100 mg/dL    BUN 16 8 - 23 MG/DL    Creatinine 1.34 (H) 0.6 - 1.0 MG/DL    GFR est AA 48 (L) >60 ml/min/1.73m2    GFR est non-AA 40 (L) >60 ml/min/1.73m2    Calcium 9.5 8.3 - 73.5 MG/DL   METABOLIC PANEL, BASIC    Collection Time: 05/12/18  9:14 AM   Result Value Ref Range    Sodium 145 136 - 145 mmol/L    Potassium 3.3 (L) 3.5 - 5.1 mmol/L    Chloride 109 (H) 98 - 107 mmol/L    CO2 25 21 - 32 mmol/L    Anion gap 11 7 - 16 mmol/L    Glucose 139 (H) 65 - 100 mg/dL    BUN 14 8 - 23 MG/DL    Creatinine 1.25 (H) 0.6 - 1.0 MG/DL    GFR est AA 52 (L) >60 ml/min/1.73m2    GFR est non-AA 43 (L) >60 ml/min/1.73m2    Calcium 9.5 8.3 - 10.4 MG/DL   CBC WITH AUTOMATED DIFF    Collection Time: 05/12/18  9:14 AM   Result Value Ref Range    WBC 11.8 (H) 4.3 - 11.1 K/uL    RBC 4.68 4.05 - 5.25 M/uL    HGB 12.5 11.7 - 15.4 g/dL    HCT 38.5 35.8 - 46.3 %    MCV 82.3 79.6 - 97.8 FL    MCH 26.7 26.1 - 32.9 PG    MCHC 32.5 31.4 - 35.0 g/dL    RDW 15.4 (H) 11.9 - 14.6 %    PLATELET 139 508 - 973 K/uL    MPV 10.9 10.8 - 14.1 FL    DF AUTOMATED      NEUTROPHILS 89 (H) 43 - 78 %    LYMPHOCYTES 6 (L) 13 - 44 %    MONOCYTES 5 4.0 - 12.0 % EOSINOPHILS 0 (L) 0.5 - 7.8 %    BASOPHILS 0 0.0 - 2.0 %    IMMATURE GRANULOCYTES 0 0.0 - 5.0 %    ABS. NEUTROPHILS 10.5 (H) 1.7 - 8.2 K/UL    ABS. LYMPHOCYTES 0.7 0.5 - 4.6 K/UL    ABS. MONOCYTES 0.5 0.1 - 1.3 K/UL    ABS. EOSINOPHILS 0.0 0.0 - 0.8 K/UL    ABS. BASOPHILS 0.0 0.0 - 0.2 K/UL    ABS. IMM. GRANS. 0.0 0.0 - 0.5 K/UL         Assessment/Plan:     Principal Problem:    Bacteremia due to Streptococcus (5/12/2018)    Active Problems:    Pulmonary embolism ([address] (7/30/2014)      Overview: 7/30/14 VQ scan:      1. Abnormal perfusion defect involving the entire right upper lobe. The       right       upper lobe contains 3 segments and therefore this would be consistent with       3       large perfusion defects. Therefore, this would be considered a high       probability       for pulmonary embolism study. HTN (hypertension) (7/30/2014)      Overview: Echo (1/22/15):  EF 50-55%. AVSC. Mild/moderate TR.  RVSP 50      DVT (deep venous thrombosis) ([address] (8/1/2014)      Overview: 1. LE Ultrasound (7/31/14) : Occlusive and nonocclusive thrombus within       the deep venous system of the left lower extremity. Pulmonary hypertension ([address] (8/4/2014)      CKD (chronic kidney disease) stage 5, GFR less than 15 ml/min ([address] (7/9/2015)      Overview: Followed by Dr. Valdo Britt. Prior left nephrectomy. HLD (hyperlipidemia) (4/21/2016)      Atrial fibrillation ([address] (4/21/2016)      Weakness (5/11/2018)      Leukocytosis (5/11/2018)      Cont IV Vanc until sensitivities result. Likely can de-escalate to cephalasporin. PCN allergy. Care Plan discussed with: Patient/Family    Total time spent with patient: 35 minutes.     Signed By: Riley Morse,      May 12, 2018

## 2018-05-12 NOTE — PROGRESS NOTES
Attempted to visit with patient. She was resting peacefully. Reviewed notes.     Ioana Michaud, staff [address], [address] [facility]  /   [email or web address]

## 2018-05-13 LAB
ANION GAP SERPL CALC-SCNC: 8 MMOL/L (ref 7–16)
BACTERIA SPEC CULT: ABNORMAL
BASOPHILS # BLD: 0 K/UL (ref 0–0.2)
BASOPHILS NFR BLD: 0 % (ref 0–2)
BUN SERPL-MCNC: 15 MG/DL (ref 8–23)
CALCIUM SERPL-MCNC: 9 MG/DL (ref 8.3–10.4)
CHLORIDE SERPL-SCNC: 113 MMOL/L (ref 98–107)
CO2 SERPL-SCNC: 26 MMOL/L (ref 21–32)
CREAT SERPL-MCNC: 1.18 MG/DL (ref 0.6–1)
DIFFERENTIAL METHOD BLD: ABNORMAL
EOSINOPHIL # BLD: 0.3 K/UL (ref 0–0.8)
EOSINOPHIL NFR BLD: 4 % (ref 0.5–7.8)
ERYTHROCYTE [DISTWIDTH] IN BLOOD BY AUTOMATED COUNT: 15.3 % (ref 11.9–14.6)
GLUCOSE SERPL-MCNC: 86 MG/DL (ref 65–100)
GRAM STN SPEC: ABNORMAL
HCT VFR BLD AUTO: 36 % (ref 35.8–46.3)
HGB BLD-MCNC: 11.5 G/DL (ref 11.7–15.4)
IMM GRANULOCYTES # BLD: 0 K/UL (ref 0–0.5)
IMM GRANULOCYTES NFR BLD AUTO: 0 % (ref 0–5)
LYMPHOCYTES # BLD: 1 K/UL (ref 0.5–4.6)
LYMPHOCYTES NFR BLD: 15 % (ref 13–44)
MCH RBC QN AUTO: 26.2 PG (ref 26.1–32.9)
MCHC RBC AUTO-ENTMCNC: 31.9 G/DL (ref 31.4–35)
MCV RBC AUTO: 82 FL (ref 79.6–97.8)
MM INDURATION POC: 0 MM (ref 0–5)
MM INDURATION POC: 0 MM (ref 0–5)
MONOCYTES # BLD: 0.6 K/UL (ref 0.1–1.3)
MONOCYTES NFR BLD: 9 % (ref 4–12)
NEUTS SEG # BLD: 4.8 K/UL (ref 1.7–8.2)
NEUTS SEG NFR BLD: 72 % (ref 43–78)
PLATELET # BLD AUTO: 160 K/UL (ref 150–450)
PMV BLD AUTO: 11 FL (ref 10.8–14.1)
POTASSIUM SERPL-SCNC: 3.4 MMOL/L (ref 3.5–5.1)
PPD POC: NEGATIVE NEGATIVE
PPD POC: NORMAL NEGATIVE
RBC # BLD AUTO: 4.39 M/UL (ref 4.05–5.25)
SERVICE CMNT-IMP: ABNORMAL
SERVICE CMNT-IMP: ABNORMAL
SODIUM SERPL-SCNC: 147 MMOL/L (ref 136–145)
WBC # BLD AUTO: 6.6 K/UL (ref 4.3–11.1)

## 2018-05-13 PROCEDURE — 36415 COLL VENOUS BLD VENIPUNCTURE: CPT | Performed by: FAMILY MEDICINE

## 2018-05-13 PROCEDURE — 74011250637 HC RX REV CODE- 250/637: Performed by: INTERNAL MEDICINE

## 2018-05-13 PROCEDURE — 74011250637 HC RX REV CODE- 250/637: Performed by: FAMILY MEDICINE

## 2018-05-13 PROCEDURE — 74011250636 HC RX REV CODE- 250/636: Performed by: INTERNAL MEDICINE

## 2018-05-13 PROCEDURE — 74011250636 HC RX REV CODE- 250/636: Performed by: FAMILY MEDICINE

## 2018-05-13 PROCEDURE — 65270000029 HC RM PRIVATE

## 2018-05-13 PROCEDURE — 80048 BASIC METABOLIC PNL TOTAL CA: CPT | Performed by: FAMILY MEDICINE

## 2018-05-13 PROCEDURE — 74011000258 HC RX REV CODE- 258: Performed by: INTERNAL MEDICINE

## 2018-05-13 PROCEDURE — 87040 BLOOD CULTURE FOR BACTERIA: CPT | Performed by: INTERNAL MEDICINE

## 2018-05-13 PROCEDURE — 85025 COMPLETE CBC W/AUTO DIFF WBC: CPT | Performed by: FAMILY MEDICINE

## 2018-05-13 RX ORDER — SODIUM CHLORIDE 450 MG/100ML
125 INJECTION, SOLUTION INTRAVENOUS CONTINUOUS
Status: DISCONTINUED | OUTPATIENT
Start: 2018-05-13 | End: 2018-05-18 | Stop reason: HOSPADM

## 2018-05-13 RX ORDER — POTASSIUM CHLORIDE 20 MEQ/1
20 TABLET, EXTENDED RELEASE ORAL DAILY
Status: DISCONTINUED | OUTPATIENT
Start: 2018-05-13 | End: 2018-05-14

## 2018-05-13 RX ADMIN — AMLODIPINE BESYLATE 5 MG: 5 TABLET ORAL at 08:44

## 2018-05-13 RX ADMIN — APIXABAN 2.5 MG: 2.5 TABLET, FILM COATED ORAL at 08:45

## 2018-05-13 RX ADMIN — SODIUM CHLORIDE 50 ML/HR: 450 INJECTION, SOLUTION INTRAVENOUS at 17:10

## 2018-05-13 RX ADMIN — APIXABAN 2.5 MG: 2.5 TABLET, FILM COATED ORAL at 17:06

## 2018-05-13 RX ADMIN — LORATADINE 10 MG: 10 TABLET ORAL at 08:44

## 2018-05-13 RX ADMIN — Medication 10 ML: at 05:43

## 2018-05-13 RX ADMIN — Medication 10 ML: at 21:56

## 2018-05-13 RX ADMIN — VANCOMYCIN HYDROCHLORIDE 1000 MG: 1 INJECTION, POWDER, LYOPHILIZED, FOR SOLUTION INTRAVENOUS at 04:25

## 2018-05-13 RX ADMIN — METOPROLOL TARTRATE 25 MG: 25 TABLET ORAL at 17:06

## 2018-05-13 RX ADMIN — DRONEDARONE 400 MG: 400 TABLET, FILM COATED ORAL at 17:06

## 2018-05-13 RX ADMIN — SODIUM CHLORIDE 75 ML/HR: 900 INJECTION, SOLUTION INTRAVENOUS at 05:46

## 2018-05-13 RX ADMIN — Medication 5 ML: at 14:43

## 2018-05-13 RX ADMIN — DRONEDARONE 400 MG: 400 TABLET, FILM COATED ORAL at 08:44

## 2018-05-13 RX ADMIN — METOPROLOL TARTRATE 25 MG: 25 TABLET ORAL at 08:44

## 2018-05-13 RX ADMIN — POTASSIUM CHLORIDE 20 MEQ: 20 TABLET, EXTENDED RELEASE ORAL at 17:06

## 2018-05-13 RX ADMIN — DONEPEZIL HYDROCHLORIDE 5 MG: 5 TABLET, FILM COATED ORAL at 21:56

## 2018-05-13 RX ADMIN — CEFTRIAXONE SODIUM 2 G: 2 INJECTION, POWDER, FOR SOLUTION INTRAMUSCULAR; INTRAVENOUS at 17:07

## 2018-05-13 NOTE — PROGRESS NOTES
Very pleasant lady  Encouraged her with presence and words of hope  Patient asked that we continue to visit her during this stay.     Nick Ortiz, staff [address], [address] [facility]  /   [email or web address]

## 2018-05-13 NOTE — PROGRESS NOTES
Hospitalist Progress Note    Subjective:   Daily Progress Note: 2018 10:06 AM      Patient was seen at bedside. Very exhausted. Poor appetite. Blood culture reported GBS bacteriemia. Switched to IV ceftriaxone today. ECHO ordered. ID consulted. Current Facility-Administered Medications   Medication Dose Route Frequency    potassium chloride (K-DUR, KLOR-CON) SR tablet 20 mEq  20 mEq Oral DAILY    0.45% sodium chloride infusion  50 mL/hr IntraVENous CONTINUOUS    cefTRIAXone (ROCEPHIN) 2 g in 0.9% sodium chloride (MBP/ADV) 50 mL  2 g IntraVENous Q24H    amLODIPine (NORVASC) tablet 5 mg  5 mg Oral DAILY    apixaban (ELIQUIS) tablet 2.5 mg  2.5 mg Oral BID    donepezil (ARICEPT) tablet 5 mg  5 mg Oral QHS    dronedarone (MULTAQ) tablet tab 400 mg  400 mg Oral BID WITH MEALS    loratadine (CLARITIN) tablet 10 mg  10 mg Oral DAILY    metoprolol tartrate (LOPRESSOR) tablet 25 mg  25 mg Oral BID    sodium chloride (NS) flush 5-10 mL  5-10 mL IntraVENous Q8H    sodium chloride (NS) flush 5-10 mL  5-10 mL IntraVENous PRN    acetaminophen (TYLENOL) tablet 650 mg  650 mg Oral Q4H PRN    ondansetron (ZOFRAN) injection 4 mg  4 mg IntraVENous Q4H PRN    magnesium hydroxide (MILK OF MAGNESIA) 400 mg/5 mL oral suspension 30 mL  30 mL Oral DAILY PRN        Review of Systems  A comprehensive review of systems was negative except for that written in the HPI.     Objective:     Visit Vitals    /66 (BP 1 Location: Right arm, BP Patient Position: Sitting)    Pulse 66    Temp 97.6 °F (36.4 °C)    Resp 20    Ht 5' 2\" (1.575 m)    Wt 67.1 kg (147 lb 14.4 oz)    SpO2 97%    BMI 27.05 kg/m2      O2 Device: Room air    Temp (24hrs), Av °F (36.7 °C), Min:97.6 °F (36.4 °C), Max:98.2 °F (36.8 °C)      701 - 1900  In: 8884 [P.O.:480; I.V.:900]  Out: -   1901 -  0700  In: 2116 [P.O.:960; I.V.:1156]  Out: 400 [Urine:400]    Visit Vitals    /66 (BP 1 Location: Right arm, BP Patient Position: Sitting)    Pulse 66    Temp 97.6 °F (36.4 °C)    Resp 20    Ht 5' 2\" (1.575 m)    Wt 67.1 kg (147 lb 14.4 oz)    SpO2 97%    BMI 27.05 kg/m2     General appearance: alert, fatigued   Head: Normocephalic, without obvious abnormality, atraumatic  Eyes: negative  Throat: Lips, mucosa, and tongue normal.   Lungs: clear to auscultation bilaterally  Heart: regular rate and rhythm, S1, S2 normal, no murmur, click, rub or gallop  Abdomen: soft, non-tender. Bowel sounds normal. No masses,  no organomegaly  Skin: Skin color, texture, turgor normal. No rashes or lesions  Neurologic: Grossly normal    Additional comments:None    Data Review    Recent Results (from the past 24 hour(s))   PLEASE READ & DOCUMENT PPD TEST IN 24 HRS    Collection Time: 05/13/18 12:05 AM   Result Value Ref Range    PPD  Negative    mm Induration 0 mm   METABOLIC PANEL, BASIC    Collection Time: 05/13/18  7:32 AM   Result Value Ref Range    Sodium 147 (H) 136 - 145 mmol/L    Potassium 3.4 (L) 3.5 - 5.1 mmol/L    Chloride 113 (H) 98 - 107 mmol/L    CO2 26 21 - 32 mmol/L    Anion gap 8 7 - 16 mmol/L    Glucose 86 65 - 100 mg/dL    BUN 15 8 - 23 MG/DL    Creatinine 1.18 (H) 0.6 - 1.0 MG/DL    GFR est AA 56 (L) >60 ml/min/1.73m2    GFR est non-AA 46 (L) >60 ml/min/1.73m2    Calcium 9.0 8.3 - 10.4 MG/DL   CBC WITH AUTOMATED DIFF    Collection Time: 05/13/18  7:32 AM   Result Value Ref Range    WBC 6.6 4.3 - 11.1 K/uL    RBC 4.39 4.05 - 5.25 M/uL    HGB 11.5 (L) 11.7 - 15.4 g/dL    HCT 36.0 35.8 - 46.3 %    MCV 82.0 79.6 - 97.8 FL    MCH 26.2 26.1 - 32.9 PG    MCHC 31.9 31.4 - 35.0 g/dL    RDW 15.3 (H) 11.9 - 14.6 %    PLATELET 191 390 - 754 K/uL    MPV 11.0 10.8 - 14.1 FL    DF AUTOMATED      NEUTROPHILS 72 43 - 78 %    LYMPHOCYTES 15 13 - 44 %    MONOCYTES 9 4.0 - 12.0 %    EOSINOPHILS 4 0.5 - 7.8 %    BASOPHILS 0 0.0 - 2.0 %    IMMATURE GRANULOCYTES 0 0.0 - 5.0 %    ABS. NEUTROPHILS 4.8 1.7 - 8.2 K/UL    ABS.  LYMPHOCYTES 1.0 0.5 - 4.6 K/UL    ABS. MONOCYTES 0.6 0.1 - 1.3 K/UL    ABS. EOSINOPHILS 0.3 0.0 - 0.8 K/UL    ABS. BASOPHILS 0.0 0.0 - 0.2 K/UL    ABS. IMM. GRANS. 0.0 0.0 - 0.5 K/UL         Assessment/Plan:     Principal Problem:    Bacteremia due to Streptococcus (5/12/2018)    Active Problems:    Pulmonary embolism ([address] (7/30/2014)      Overview: 7/30/14 VQ scan:      1. Abnormal perfusion defect involving the entire right upper lobe. The       right       upper lobe contains 3 segments and therefore this would be consistent with       3       large perfusion defects. Therefore, this would be considered a high       probability       for pulmonary embolism study. HTN (hypertension) (7/30/2014)      Overview: Echo (1/22/15):  EF 50-55%. AVSC. Mild/moderate TR.  RVSP 50      DVT (deep venous thrombosis) ([address] (8/1/2014)      Overview: 1. LE Ultrasound (7/31/14) : Occlusive and nonocclusive thrombus within       the deep venous system of the left lower extremity. Pulmonary hypertension ([address] (8/4/2014)      CKD (chronic kidney disease) stage 5, GFR less than 15 ml/min ([address] (7/9/2015)      Overview: Followed by Dr. Doug Rodriguez. Prior left nephrectomy.       HLD (hyperlipidemia) (4/21/2016)      Atrial fibrillation ([address] (4/21/2016)      Weakness (5/11/2018)      Leukocytosis (5/11/2018)      Bacteremia (5/12/2018)          # GBS bacteriemia  -unclear etiology   -ECHO ordered   -initially on Vanco. Switched to IV ceftriaxone today ( allergic to PCN but has received cephalosporins in the past)   -ID consulted     # Afib   -controlled   -on eliquis   -on dronedarone     #CKD  -stable   -will monitor     #HTN  -stable     #hypokalemia   -corrected     #hypernatremia  -switched to IV 1/2 NS    DVT PPX: on eliquis     Signed By: Saritha Acosta MD     May 13, 2018

## 2018-05-13 NOTE — PROGRESS NOTES
Pt sitting up in chair, frequently attempting to get up \"to go home\". Lap band ordered, chair alarm on. Respirations even and unlabored. No complaints of pain at this time. No s/s of distress noted. Call light in reach. Bedside report given to oncoming nurse.

## 2018-05-13 NOTE — PROGRESS NOTES
SBAR received from [employer] [facility]. Patient remains confused at this time. Frequent attempts to ambulate from chair, stating repeatedly \" I want to go home. \" [place] pad on chair at this time. Spoke with charge nurse to obtain order for lap belt for patient safety. Lung sounds clear, no acute distress. Iv fluids infusing at this time. Call light within reach. Will continue to monitor.

## 2018-05-13 NOTE — PROGRESS NOTES
Pt awake and alert & oriented x2. VSS. Respirations even and unlabored. No complaints of pain. No s/s of acute pain or distress. Call light in reach. Instructed patient to call for assistance. All questions answered and needs addressed at this time. Safety maintained.

## 2018-05-14 LAB
ANION GAP SERPL CALC-SCNC: 10 MMOL/L (ref 7–16)
APPEARANCE FLD: NORMAL
ATRIAL RATE: 62 BPM
BUN SERPL-MCNC: 15 MG/DL (ref 8–23)
CALCIUM SERPL-MCNC: 9.4 MG/DL (ref 8.3–10.4)
CALCULATED P AXIS, ECG09: 66 DEGREES
CALCULATED R AXIS, ECG10: 20 DEGREES
CALCULATED T AXIS, ECG11: 53 DEGREES
CHLORIDE SERPL-SCNC: 113 MMOL/L (ref 98–107)
CO2 SERPL-SCNC: 25 MMOL/L (ref 21–32)
COLOR FLD: NORMAL
CREAT SERPL-MCNC: 1.18 MG/DL (ref 0.6–1)
DIAGNOSIS, 93000: NORMAL
ERYTHROCYTE [DISTWIDTH] IN BLOOD BY AUTOMATED COUNT: 15.2 % (ref 11.9–14.6)
GLUCOSE SERPL-MCNC: 89 MG/DL (ref 65–100)
HCT VFR BLD AUTO: 36.7 % (ref 35.8–46.3)
HGB BLD-MCNC: 11.5 G/DL (ref 11.7–15.4)
LYMPHOCYTES NFR FLD: 59 %
MCH RBC QN AUTO: 25.9 PG (ref 26.1–32.9)
MCHC RBC AUTO-ENTMCNC: 31.3 G/DL (ref 31.4–35)
MCV RBC AUTO: 82.7 FL (ref 79.6–97.8)
NEUTROPHILS NFR FLD: 41 %
NUC CELL # FLD: 319 /CU MM
P-R INTERVAL, ECG05: 136 MS
PLATELET # BLD AUTO: 177 K/UL (ref 150–450)
PMV BLD AUTO: 11.7 FL (ref 10.8–14.1)
POTASSIUM SERPL-SCNC: 3.4 MMOL/L (ref 3.5–5.1)
Q-T INTERVAL, ECG07: 424 MS
QRS DURATION, ECG06: 90 MS
QTC CALCULATION (BEZET), ECG08: 430 MS
RBC # BLD AUTO: 4.44 M/UL (ref 4.05–5.25)
RBC # FLD: NORMAL /CU MM
SODIUM SERPL-SCNC: 148 MMOL/L (ref 136–145)
SPECIMEN SOURCE FLD: NORMAL
VENTRICULAR RATE, ECG03: 62 BPM
WBC # BLD AUTO: 4.7 K/UL (ref 4.3–11.1)

## 2018-05-14 PROCEDURE — 89060 EXAM SYNOVIAL FLUID CRYSTALS: CPT | Performed by: INTERNAL MEDICINE

## 2018-05-14 PROCEDURE — 20610 DRAIN/INJ JOINT/BURSA W/O US: CPT

## 2018-05-14 PROCEDURE — 97530 THERAPEUTIC ACTIVITIES: CPT

## 2018-05-14 PROCEDURE — 80048 BASIC METABOLIC PNL TOTAL CA: CPT | Performed by: INTERNAL MEDICINE

## 2018-05-14 PROCEDURE — 74011000250 HC RX REV CODE- 250

## 2018-05-14 PROCEDURE — 97110 THERAPEUTIC EXERCISES: CPT

## 2018-05-14 PROCEDURE — 93005 ELECTROCARDIOGRAM TRACING: CPT | Performed by: INTERNAL MEDICINE

## 2018-05-14 PROCEDURE — 36415 COLL VENOUS BLD VENIPUNCTURE: CPT | Performed by: INTERNAL MEDICINE

## 2018-05-14 PROCEDURE — 74011250636 HC RX REV CODE- 250/636: Performed by: HOSPITALIST

## 2018-05-14 PROCEDURE — 97168 OT RE-EVAL EST PLAN CARE: CPT

## 2018-05-14 PROCEDURE — 74011000258 HC RX REV CODE- 258: Performed by: INTERNAL MEDICINE

## 2018-05-14 PROCEDURE — 93306 TTE W/DOPPLER COMPLETE: CPT

## 2018-05-14 PROCEDURE — 97535 SELF CARE MNGMENT TRAINING: CPT

## 2018-05-14 PROCEDURE — 89050 BODY FLUID CELL COUNT: CPT | Performed by: INTERNAL MEDICINE

## 2018-05-14 PROCEDURE — 87205 SMEAR GRAM STAIN: CPT | Performed by: INTERNAL MEDICINE

## 2018-05-14 PROCEDURE — 74011250636 HC RX REV CODE- 250/636: Performed by: INTERNAL MEDICINE

## 2018-05-14 PROCEDURE — 74011250636 HC RX REV CODE- 250/636: Performed by: FAMILY MEDICINE

## 2018-05-14 PROCEDURE — 85027 COMPLETE CBC AUTOMATED: CPT | Performed by: INTERNAL MEDICINE

## 2018-05-14 PROCEDURE — 65660000000 HC RM CCU STEPDOWN

## 2018-05-14 PROCEDURE — 74011250637 HC RX REV CODE- 250/637: Performed by: INTERNAL MEDICINE

## 2018-05-14 PROCEDURE — 74011250637 HC RX REV CODE- 250/637: Performed by: FAMILY MEDICINE

## 2018-05-14 PROCEDURE — 0S9C3ZX DRAINAGE OF RIGHT KNEE JOINT, PERCUTANEOUS APPROACH, DIAGNOSTIC: ICD-10-PCS | Performed by: ORTHOPAEDIC SURGERY

## 2018-05-14 RX ORDER — LIDOCAINE HYDROCHLORIDE 10 MG/ML
10 INJECTION INFILTRATION; PERINEURAL ONCE
Status: COMPLETED | OUTPATIENT
Start: 2018-05-14 | End: 2018-05-14

## 2018-05-14 RX ORDER — POTASSIUM CHLORIDE 20 MEQ/1
20 TABLET, EXTENDED RELEASE ORAL 2 TIMES DAILY
Status: DISCONTINUED | OUTPATIENT
Start: 2018-05-14 | End: 2018-05-18 | Stop reason: HOSPADM

## 2018-05-14 RX ORDER — HALOPERIDOL 5 MG/ML
2 INJECTION INTRAMUSCULAR
Status: DISCONTINUED | OUTPATIENT
Start: 2018-05-14 | End: 2018-05-18 | Stop reason: HOSPADM

## 2018-05-14 RX ORDER — DIPHENHYDRAMINE HYDROCHLORIDE 50 MG/ML
25 INJECTION, SOLUTION INTRAMUSCULAR; INTRAVENOUS ONCE
Status: COMPLETED | OUTPATIENT
Start: 2018-05-14 | End: 2018-05-14

## 2018-05-14 RX ORDER — LORAZEPAM 2 MG/ML
1 INJECTION INTRAMUSCULAR
Status: DISCONTINUED | OUTPATIENT
Start: 2018-05-14 | End: 2018-05-18 | Stop reason: HOSPADM

## 2018-05-14 RX ORDER — LIDOCAINE HYDROCHLORIDE 10 MG/ML
INJECTION INFILTRATION; PERINEURAL
Status: COMPLETED
Start: 2018-05-14 | End: 2018-05-14

## 2018-05-14 RX ORDER — HALOPERIDOL 5 MG/ML
2 INJECTION INTRAMUSCULAR ONCE
Status: COMPLETED | OUTPATIENT
Start: 2018-05-14 | End: 2018-05-14

## 2018-05-14 RX ADMIN — POTASSIUM CHLORIDE 20 MEQ: 20 TABLET, EXTENDED RELEASE ORAL at 17:15

## 2018-05-14 RX ADMIN — LIDOCAINE HYDROCHLORIDE 1 ML: 10 INJECTION INFILTRATION; PERINEURAL at 14:43

## 2018-05-14 RX ADMIN — DRONEDARONE 400 MG: 400 TABLET, FILM COATED ORAL at 08:25

## 2018-05-14 RX ADMIN — METOPROLOL TARTRATE 25 MG: 25 TABLET ORAL at 17:15

## 2018-05-14 RX ADMIN — Medication 10 ML: at 05:23

## 2018-05-14 RX ADMIN — LIDOCAINE HYDROCHLORIDE 1 ML: 10 INJECTION, SOLUTION INFILTRATION; PERINEURAL at 14:43

## 2018-05-14 RX ADMIN — CEFTRIAXONE SODIUM 2 G: 2 INJECTION, POWDER, FOR SOLUTION INTRAMUSCULAR; INTRAVENOUS at 17:15

## 2018-05-14 RX ADMIN — APIXABAN 2.5 MG: 2.5 TABLET, FILM COATED ORAL at 08:26

## 2018-05-14 RX ADMIN — LORATADINE 10 MG: 10 TABLET ORAL at 08:26

## 2018-05-14 RX ADMIN — Medication 10 ML: at 15:59

## 2018-05-14 RX ADMIN — METOPROLOL TARTRATE 25 MG: 25 TABLET ORAL at 08:25

## 2018-05-14 RX ADMIN — Medication 10 ML: at 22:36

## 2018-05-14 RX ADMIN — HALOPERIDOL LACTATE 2 MG: 5 INJECTION, SOLUTION INTRAMUSCULAR at 21:22

## 2018-05-14 RX ADMIN — APIXABAN 2.5 MG: 2.5 TABLET, FILM COATED ORAL at 17:15

## 2018-05-14 RX ADMIN — SODIUM CHLORIDE 50 ML/HR: 450 INJECTION, SOLUTION INTRAVENOUS at 14:46

## 2018-05-14 RX ADMIN — DIPHENHYDRAMINE HYDROCHLORIDE 25 MG: 50 INJECTION, SOLUTION INTRAMUSCULAR; INTRAVENOUS at 03:57

## 2018-05-14 RX ADMIN — DONEPEZIL HYDROCHLORIDE 5 MG: 5 TABLET, FILM COATED ORAL at 22:36

## 2018-05-14 RX ADMIN — AMLODIPINE BESYLATE 5 MG: 5 TABLET ORAL at 08:25

## 2018-05-14 RX ADMIN — POTASSIUM CHLORIDE 20 MEQ: 20 TABLET, EXTENDED RELEASE ORAL at 08:25

## 2018-05-14 RX ADMIN — DRONEDARONE 400 MG: 400 TABLET, FILM COATED ORAL at 17:15

## 2018-05-14 NOTE — PROGRESS NOTES
Problem: Self Care Deficits Care Plan (Adult)  Goal: *Acute Goals and Plan of Care (Insert Text)  1. Patient will complete upper body bathing and dressing after set up while in unsupported sitting at EOB. MODIFIED  2. Patient will complete toileting and toileting transfers with SBA. MODIFIED  3. Patient will tolerate 25 minutes of OT treatment with 3-4 rest breaks to increase activity tolerance for ADLs. CONTINUE  4. Patient will complete functional transfers with SBA and adaptive equipment as needed. MODIFIED     Timeframe: 7 visits       OCCUPATIONAL THERAPY: Re-evaluation and AM 5/14/2018  INPATIENT: Hospital Day: 4  Payor: FIRST CHO[employer] VIP CARE PLUS / [place] [employer] VIP [employer]ARE [place]US / Product Type: Managed Care Medicare /      NAME/AGE/GENDER: Catrina Lebron is a 80 y.o. female   PRIMARY DIAGNOSIS:  Weakness  Bacteremia Bacteremia due to Streptococcus Bacteremia due to Streptococcus        ICD-10: Treatment Diagnosis:    · Pain in Left Knee (M25.562)  · Generalized Muscle Weakness (M62.81)  · Other lack of cordination (R27.8)  · History of falling (Z91.81)   Precautions/Allergies:     Lipitor [atorvastatin] and Pcn [penicillins]      ASSESSMENT:     Ms. Maeve Tirado presents to the hospital with bacteremia and weakness. Pt lives alone with caregivers coming for 6 hours daily M-F and 2 hours on the weekend. 5/14/2018 pt found supine in bed upon arrival, alert and oriented to person/place, and agreeable to OT. Pt completes supine to sit with SBA and scooting with SBA and additional time. Pt completes STS with min A x2 and transfers to [facility] with min A x2 with HHA/no AD. Pt requires mod A for toileting for clothing management. She is able to complete brandi care after set up. Pt requires min A x2/HHA to transfer from [facility] to recliner chair. Pt left with chair alarm on, possessions in reach, and all needs met.  She has demonstrated progress since initial evaluation thus goals were updated to reflect pt's most current status. Will follow during acute stay. This section established at most recent assessment   PROBLEM LIST (Impairments causing functional limitations):  1. Decreased Strength  2. Decreased ADL/Functional Activities  3. Decreased Transfer Abilities  4. Decreased Ambulation Ability/Technique  5. Decreased Balance  6. Increased Pain  7. Decreased Activity Tolerance  8. Increased Fatigue  9. Decreased Flexibility/Joint Mobility  10. Decreased [place] with Home Exercise Program  11. Decreased Cognition   INTERVENTIONS PLANNED: (Benefits and precautions of occupational therapy have been discussed with the patient.)  1. Activities of daily living training  2. Adaptive equipment training  3. Balance training  4. Clothing management  5. Cognitive training  6. Donning&doffing training  7. Group therapy  8. Neuromuscular re-eduation  9. Therapeutic activity  10. Therapeutic exercise     TREATMENT PLAN: Frequency/Duration: Follow patient 3 times per week to address above goals. Rehabilitation Potential For Stated Goals: Good     RECOMMENDED REHABILITATION/EQUIPMENT: (at time of discharge pending progress): Due to the probability of continued deficits (see above) this patient will likely need continued skilled occupational therapy after discharge. Equipment:    TBD              OCCUPATIONAL PROFILE AND HISTORY:   History of Present Injury/Illness (Reason for Referral):  See H&P  Past Medical History/Comorbidities:   Ms. Jamaica Chung  has a past medical history of Anemia (4/21/2016); Anemia due to blood loss, acute (2014); Arthritis; Atrial fibrillation ([facility] (4/21/2016); Cancer [facility]) (Jan 2002); DVT (deep venous thrombosis) ([facility] (7/2014); Dyspnea on exertion; Edema; Environmental allergies; GERD (gastroesophageal reflux disease); HLD (hyperlipidemia) (4/21/2016); Hyperlipidemia; Hypertension; Hypokalemia (2014); Incisional hernia; Low serum sodium (2014); Melena (2014); PE (pulmonary embolism) (7/2014);  Pulmonary HTN ([facility] Tricuspid regurgitation; and Vitamin D deficiency. Ms. Maeve Tirado  has a past surgical history that includes hx bipin and bso (1971); hx urological (2002); and hx hernia repair (6/15/12). Social History/Living Environment:   Home Environment: Private residence  # Steps to Enter: 2  Rails to Enter: Yes  Hand Rails : Right  One/Two Story Residence: One story  Living Alone: Yes  Support Systems: Child(claudia), Home care staff  Patient Expects to be Discharged to[de-identified] Unknown  Current DME Used/Available at Home: Cane, quad, Atha Milton, straight, Shower chair  Tub or Shower Type: Tub/Shower combination  Prior Level of Function/Work/Activity:  Pt lives at home alone with caregivers for 4 hours in the morning and 2 hours in the evening M-F. Sat and Sunday pt has caregiver for 2 hours in the evening. Pt was using a cane for functional mobility. Pt was still able to get to the bathroom on her own. Caregivers were assisting her with ADL. Pt 's daughter checks in on her as well. Personal Factors:          Age:  80 y.o. Social Background:  Lives alone. Past/Current Experience:  Hx of lowering/falling to the floor        Other factors that influence how disability is experienced by the patient:  Multiple co-morbidities    Number of Personal Factors/Comorbidities that affect the Plan of Care: Extensive review of physical, cognitive, and psychosocial performance (3+):  HIGH COMPLEXITY   ASSESSMENT OF OCCUPATIONAL PERFORMANCE[de-identified]   Activities of Daily Living:           Basic ADLs (From Assessment) Complex ADLs (From Assessment)   Feeding: Minimum assistance  Oral Facial Hygiene/Grooming: Moderate assistance  Bathing: Maximum assistance  Upper Body Dressing: Moderate assistance  Lower Body Dressing: Total assistance  Toileting: Total assistance Instrumental ADL  Meal Preparation: Total assistance  Homemaking:  Total assistance   Grooming/Bathing/Dressing Activities of Daily Living     Cognitive Retraining  Safety/Judgement: Fall prevention;Home safety                       Bed/Mat Mobility  Supine to Sit: Stand-by assistance  Sit to Stand: Minimum assistance;Assist x2  Bed to Chair: Minimum assistance;Assist x2  Scooting: Stand-by assistance       Most Recent Physical Functioning:   Gross Assessment:                  Posture:  Posture (WDL): Exceptions to WDL  Posture Assessment: Rounded shoulders  Balance:  Sitting: Intact  Standing: Impaired  Standing - Static: Fair  Standing - Dynamic : Fair Bed Mobility:  Supine to Sit: Stand-by assistance  Scooting: Stand-by assistance  Wheelchair Mobility:     Transfers:  Sit to Stand: Minimum assistance;Assist x2  Stand to Sit: Minimum assistance  Bed to Chair: Minimum assistance;Assist x2              Patient Vitals for the past 6 hrs:   BP BP Patient Position SpO2 Pulse   18 0808 135/75 - 97 % 74   18 1150 118/68 Sitting 97 % (!) 56       Mental Status  Neurologic State: Alert  Orientation Level: Oriented to person, Oriented to place  Cognition: Follows commands  Perception: Appears intact  Perseveration: No perseveration noted  Safety/Judgement: Fall prevention, Home safety                          Physical Skills Involved:  1. Range of Motion  2. Balance  3. Strength  4. Activity Tolerance  5. Gross Motor Control  6. Pain (acute)  7. Pain (Chronic)  8. Skin Integrity Cognitive Skills Affected (resulting in the inability to perform in a timely and safe manner):  1. Executive Function  2. Short Term Recall  3. Divided Attention Psychosocial Skills Affected:  1. Habits/Routines  2. Environmental Adaptation  3. Self-Awareness   Number of elements that affect the Plan of Care: 5+:  HIGH COMPLEXITY   CLINICAL DECISION MAKIN [facility] [address] [id number] AM-PAC 6 Clicks   Daily Activity Inpatient Short Form  How much help from another person does the patient currently need. .. Total A Lot A Little None   1. Putting on and taking off regular lower body clothing? [] 1   [x] 2   [] 3   [] 4   2. Bathing (including washing, rinsing, drying)? [] 1   [x] 2   [] 3   [] 4   3. Toileting, which includes using toilet, bedpan or urinal?   [] 1   [x] 2   [] 3   [] 4   4. Putting on and taking off regular upper body clothing? [] 1   [] 2   [x] 3   [] 4   5. Taking care of personal grooming such as brushing teeth? [] 1   [] 2   [x] 3   [] 4   6. Eating meals? [] 1   [] 2   [] 3   [x] 4   © 2007, Trustees of [address] [place] [address] [address] [id number], under license to [employer]. All rights reserved      Score:  Initial: 11 Most Recent: 16(Date: -- )    Interpretation of Tool:  Represents activities that are increasingly more difficult (i.e. Bed mobility, Transfers, Gait). Score 24 23 22-20 19-15 14-10 9-7 6     Modifier CH CI CJ CK CL CM CN      ? Self Care:     - CURRENT STATUS: CK - 40%-59% impaired, limited or restricted    - GOAL STATUS: CI - 1%-19% impaired, limited or restricted    - D/C STATUS:  ---------------To be determined---------------  Payor: FIRST CHO[employer] VIP CARE PLUS [place] [employer] VIP [employer] [place]US / Product Type: Managed Care Medicare /      Medical Necessity:     · Patient demonstrates good rehab potential due to higher previous functional level. Reason for Services/Other Comments:  · Patient continues to require skilled intervention due to decreased independence with ADL/functional transfers. Use of outcome tool(s) and clinical judgement create a POC that gives a: MODERATE COMPLEXITY         TREATMENT:   (In addition to Assessment/Re-Assessment sessions the following treatments were rendered)     Pre-treatment Symptoms/Complaints:    Pain: Initial:   Pain Intensity 1: 0  Post Session:  same     Assessment/Reassessment only, no treatment provided today    Braces/Orthotics/Lines/Etc:   · IV  · O2 Device: Room air  Treatment/Session Assessment:    · Response to Treatment:  Pt tolerated well without complications or complaints.    · Interdisciplinary Collaboration:   o Occupational Therapist  o Rehabilitation Attendant  o Certified Nursing Assistant/Patient Care Technician  · After treatment position/precautions:   o Up in chair  o Bed alarm/tab alert on  o Bed/Chair-wheels locked  o Call light within reach   · Compliance with Program/Exercises: Will assess as treatment progresses. · Recommendations/Intent for next treatment session: \"Next visit will focus on advancements to more challenging activities and reduction in assistance provided\".   Total Treatment Duration:  OT Patient Time In/Time Out  Time In: 1030  Time Out: [address]

## 2018-05-14 NOTE — PROGRESS NOTES
RN received patient in stable condition. Patient is alert anxious and orientated to name. Patient up to [facility] voiding. Patients lung sounds diminish. Patient denies co pain. Patient keeps trying to get oob with assistance. Patient orientated to place and purpose. Patient tolerating diet and fluids denies co n/v. IV fluids infusing well. No distress. Call light with in reach. RN will continue to monitor.

## 2018-05-14 NOTE — PROGRESS NOTES
Notified Dr. Blaire Phillip of patient's increase agitation and attempt to get physical with medical staff. Several attempts made to calm patient with use of mitts, lap belt and therapeutic talk. Patient continues to be agitated. Verbal orders received for Haldol 2 mg IVP x 1, MD also gave verbal orders that if one time dose does not work that an additional one time dose of haldol can be ordered. Notified Dr. Blaire Phillip that due to patient being on donepezil and dronedarone; Haldol 2 mg IVP can not be given to to extreme high risk of prolonging patient's QT interval. New verbal orders received to give patient Benadryl 25 mg IVP one time dose. Will continue to monitor.

## 2018-05-14 NOTE — CONSULTS
Ortho consult for Dejah Olivas who is currently admitted for bacteremia with no definitive cause. Consult to r/o right knee as source of infection. She reports chronic, long standing issues with the right knee and has a hx of arthritis there. No prior surgeries. No injury. She did have a cortisone injection 6-8 weeks ago. There has been no increase in pain or swelling in the right knee recently. Pain in the knee currently appears to be at baseline for her. Infectious disase was consulted and they recommended aspiration of the knee. PE: Pt alert and oriented with appropriate conversation and eating lunch. The right knee with no erythema or significant effusion. End range limitations with flexion without significant pain. Skin is intact. . There is some general tenderness around the knee. Procedure note: I obtained verbal consent from the patient for right knee aspiration and talked about risks and benefits. After sterilely prepping the superolateral aspect of the right knee, the area was anesthetized with 5 cc of 1% lidocaine and then aspirated of approximately 5 cc of serous fluid. Joint aspirate will be sent for labs. Does not clinically appear to be a septic joint on exam.  Join aspirate appeared normal. I do not suspect this to be the cause of her bacteremia. X-rays are pending. Will follow cultures and if these are negative recommend follow up with her orthopedist as an outpatient for the right knee. I discussed patient and findings with Dr. Janie Phillips.

## 2018-05-14 NOTE — PROGRESS NOTES
Patient's daughter called to discuss update. Talked with daughter about possibly using a lap belt for mother's safety due to increase movement of getting out of bed and high risk for potential fall. Daughter verbally stated, \" absolutely, please use the lap belt on her I give permission, I don't want her to fall. \" [place] pad in place with bed alarm. Lap Belt in place for patient safety. Will continue to monitor.

## 2018-05-14 NOTE — CONSULTS
Infectious Disease Consult    Today's Date: 5/14/2018   Admit Date: 5/11/2018    Impression:   · Group B strep bacteremia (5/12), source unknown  · R knee pain/swelling in setting of above. Recent steroid injection with no relief. Concern for septic joint  · Fever/leukocytosis: resolved  · AMS/delirium  · Hx cerviocal/renal cancer, s/p chemo/XRT, left nephrectomy 2002  · CKD 5, CrCl ~30  · PCN allergy: rash/skin peeling    Plan:   · Continue Ceftriaxone at current dose  · Recommend ortho eval and R knee aspirate with fluid sent for cell count, cultures  · Follow TTE  · If above work up is unrevealing, consider CT abd/pelvis (w/ contrast and diligent hydration) to evaluate for intra-abdominal etiology given hx of pelvic cancer    Anti-infectives:   Ceftriaxone 5/13-  Vanc 5/12-5/13    Subjective:   Date of Consultation:  May 14, 2018  Referring Physician: Dr Radha Carr    Patient is a 80 y.o. female brought in the ED sec to acute onset AMS, weakness, SOB, noted to have fever 100.4, mild leuko WBC 12.8, no lactate, BC with all 4 bottles growing GBS, UA benign, CXR with concerns for possible bronchitis. She was on Vanc, now on CTX. She remains confused, requiring restraints for safety. Repeat BC are negative so far, TTE has been ordered, ID consulted to make treatment recommendations. She is more lucid this morning able to participate in HPI. She reports R knee pain, this has been on going for several weeks-months, however about a month ago she had her knee injected, but this this did not help. She denies ongoing illness at home, except for a \"light cold\". No nausea, diarrhea, abdominal pain, wounds/sores, skin issues, mouth ulcers/pain, no recent surgeries, no dysuria, SOB/ cough at present. Family, Social and PMHx reviewed with pertinent data mentioned in the note.     Patient Active Problem List   Diagnosis Code    Pulmonary embolism (HCC) I26.99    HTN (hypertension) I10    DVT (deep venous thrombosis) ([facility] I82.409    Pulmonary hypertension (HCC) I27.20    CKD (chronic kidney disease) stage 5, GFR less than 15 ml/min (HCC) N18.5    HLD (hyperlipidemia) E78.5    Atrial fibrillation (HCC) I48.91    Weakness R53.1    Leukocytosis D72.829    Bacteremia due to Streptococcus R78.81, B95.5    Bacteremia R78.81     Past Medical History:   Diagnosis Date    Anemia 4/21/2016    Anemia due to blood loss, acute 2014    Arthritis     osteo    Atrial fibrillation ([facility] 4/21/2016    Cancer ([facility] Jan 2002    left renal (nephrectomy) and cervical (ROSANNA, BSO)  also had chemo and radiation    DVT (deep venous thrombosis) ([facility] 7/2014    Dyspnea on exertion     started 2009. No associated chest pain    Edema     Environmental allergies     controlled with occasional loratadine    GERD (gastroesophageal reflux disease)     controlled with prn Ranitidine    HLD (hyperlipidemia) 4/21/2016    Hyperlipidemia     Hypertension     controlled with medication    Hypokalemia 2014    Incisional hernia     Low serum sodium 2014    Melena 2014    PE (pulmonary embolism) 7/2014    Pulmonary HTN (HCC)     Tricuspid regurgitation     Mild to Moderate    Vitamin D deficiency       Family History   Problem Relation Age of Onset    Cancer Mother     Hypertension Mother     Stroke Father     Heart Disease Father       Social History   Substance Use Topics    Smoking status: Former Smoker     Packs/day: 0.50     Years: 10.00     Types: Cigarettes     Quit date: 6/8/2000    Smokeless tobacco: Never Used      Comment: Stopped tobacco use 14 years ago    Alcohol use No     Past Surgical History:   Procedure Laterality Date    HX HERNIA REPAIR  6/15/12    Dr. Cedillo  HX UROLOGICAL  2002    left nephrectomy      Prior to Admission medications    Medication Sig Start Date End Date Taking? Authorizing Provider   amLODIPine (NORVASC) 5 mg tablet Take 5 mg by mouth daily.     Historical Provider apixaban (ELIQUIS) 2.5 mg tablet Take 1 Tab by mouth two (2) times a day. 18   Hugo Palomino MD   dronedarone (MULTAQ) tab tablet Take 1 Tab by mouth two (2) times daily (with meals). 18   Hugo Palomino MD   donepezil (ARICEPT) 5 mg tablet Take  by mouth nightly. Historical Provider   metoprolol tartrate (LOPRESSOR) 25 mg tablet Take 1 Tab by mouth two (2) times a day. 10/18/17   Hugo Palomino MD   multivitamin (ONE A DAY) tablet Take 1 Tab by mouth daily. Historical Provider   loratadine (CLARITIN) 10 mg tablet Take 10 mg by mouth daily. Historical Provider       Allergies   Allergen Reactions    Lipitor [Atorvastatin] Unable to Obtain    Pcn [Penicillins] Rash     With peeling skin        Review of Systems:  A comprehensive review of systems was negative except for that written in the History of Present Illness. Objective:     Visit Vitals    /75    Pulse 74    Temp 98.2 °F (36.8 °C)    Resp 18    Ht 5' 2\" (1.575 m)    Wt 67.1 kg (147 lb 14.4 oz)    SpO2 97%    BMI 27.05 kg/m2     Temp (24hrs), Av.9 °F (36.6 °C), Min:97.6 °F (36.4 °C), Max:98.2 °F (36.8 °C)       Lines:  Peripheral IV:  LEft arm intact          Physical Exam:    General:  Alert, cooperative, well noursished, well developed, appears stated age, obese   Eyes:  Sclera anicteric. Pupils equally round and reactive to light. Mouth/Throat: Mucous membranes normal, oral pharynx clear   Neck: Supple   Lungs:   Clear to auscultation bilaterally, good effort   CV:  Regular rate and rhythm,no murmur, click, rub or gallop   Abdomen:   Soft, non-tender. bowel sounds normal. non-distended   Extremities: No cyanosis, R knee is edematous and painful with palpation/ROM.     Skin: Skin color, texture, turgor normal. no acute rash or lesions   Lymph nodes: Cervical and supraclavicular normal   Musculoskeletal: No swelling or deformity   Lines/Devices:  Intact, no erythema, drainage or tenderness   Psych: Alert and oriented, normal mood affect given the setting       Data Review:     CBC:  Recent Labs      05/14/18   0632  05/13/18   0732  05/12/18   0914   WBC  4.7  6.6  11.8*   GRANS   --   72  89*   MONOS   --   9  5   EOS   --   4  0*   ANEU   --   4.8  10.5*   ABL   --   1.0  0.7   HGB  11.5*  11.5*  12.5   HCT  36.7  36.0  38.5   PLT  177  160  169       BMP:  Recent Labs      05/14/18   0632  05/13/18   0732  05/12/18   0914   CREA  1.18*  1.18*  1.25*   BUN  15  15  14   NA  148*  147*  145   K  3.4*  3.4*  3.3*   CL  113*  113*  109*   CO2  25  26  25   AGAP  10  8  11   GLU  89  86  139*       LFTS:  Recent Labs      05/11/18   1154   TBILI  1.3*   ALT  22   SGOT  31   AP  77   TP  7.7   ALB  3.0*       Microbiology:     All Micro Results     Procedure Component Value Units Date/Time    CULTURE, BLOOD [[id number]] Collected:  05/13/18 1131    Order Status:  Completed Specimen:  Blood from Blood Updated:  05/14/18 0650     Special Requests: --        RIGHT  HAND       Culture result: NO GROWTH AFTER 18 HOURS       CULTURE, BLOOD [[id number]] Collected:  05/13/18 1125    Order Status:  Completed Specimen:  Blood from Blood Updated:  05/14/18 0650     Special Requests: --        LEFT  Antecubital       Culture result: NO GROWTH AFTER 18 HOURS       CULTURE, BLOOD [[id number]]  (Abnormal) Collected:  05/11/18 1259    Order Status:  Completed Specimen:  Blood from Blood Updated:  05/13/18 0622     Special Requests: --        RIGHT  Antecubital       GRAM STAIN GRAM POSITIVE COCCI                 AEROBIC AND ANAEROBIC BOTTLES              CRITICAL RESULT NOT CALLED DUE TO PREVIOUS NOTIFICATION OF CRITICAL RESULT WITHIN THE LAST 24 HOURS.      Culture result:         STREPTOCOCCUS AGALACTIAE SERO GROUP B (A)            For Susceptibility Refer to Culture  ACCESSION [id number]      CULTURE, BLOOD [[id number]]  (Abnormal)  (Susceptibility) Collected:  05/11/18 1259    Order Status:  Completed Specimen:  Blood from Blood Updated:  18 0622     Special Requests: --        LEFT  Antecubital       GRAM STAIN GRAM POSITIVE COCCI                 AEROBIC AND ANAEROBIC BOTTLES              RESULTS VERIFIED, PHONED TO AND READ BACK BY Jose F Treadwell RN ON 2018 AT 2242 WMR     Culture result:         STREPTOCOCCUS AGALACTIAE SERO GROUP B (A)          Imagin18 CXR  IMPRESSION:  1. Marked underventilation and perihilar vascular crowding. IMPRESSION:  Favor bronchitis.   Signed By: Katy Gray NP     May 14, 2018

## 2018-05-14 NOTE — PROGRESS NOTES
Care Management Interventions  PCP Verified by CM: Yes  Physical Therapy Consult: Yes  Occupational Therapy Consult: Yes  Current Support Network: Lives Alone  Confirm Follow Up Transport: Other (see comment)  Plan discussed with Pt/Family/Caregiver: Yes  Freedom of Choice Offered: Yes  Discharge Location  Discharge Placement: Unable to determine at this time  Patient is alert and oriented in all spheres. Lives alone. Patient has [address] aides that assist with ADLs and drive patient to medical appts. Patient reports that she goes to an adult day center daily. Patient's daughter, lives in [place] but checks on her often. PT/OT consulted. Ppd placed. CM following.

## 2018-05-14 NOTE — PROGRESS NOTES
Patient has made several attempts this evening to get out of bed and has been pulling at lines despite lap belt in place. Staff and myself have made attempts to reorient patient to situation and that patient is a huge fall risk. Patient keeps stating, \" this is my house and you wont do this in my house. \" Patient is unable to state where she is at and what is going on situation wise. Fellow RN and self assisted patient to bedpan which patient was somewhat able to tolerate. Instructed patient to call for help using her call light for assistance. Bed low and locked, posey pad on and in place. Call light within reach. RN/ CNA close to patient's room for assistance. Will continue to monitor closely.

## 2018-05-14 NOTE — PROGRESS NOTES
Problem: Mobility Impaired (Adult and Pediatric)  Goal: *Acute Goals and Plan of Care (Insert Text)  STG:  (1.)Ms. Leah Greene will move from supine to sit and sit to supine , scoot up and down and roll side to side with MINIMAL ASSIST within 3 treatment day(s). (2.)Ms. Leah Greene will transfer from bed to chair and chair to bed with MINIMAL ASSIST using the least restrictive device within 3 treatment day(s). MET 5/14/18  (3.)Ms. Leah Greene will ambulate with MINIMAL ASSIST for 40 feet with the least restrictive device within 3 treatment day(s). LTG:  (1.)Ms. Leah Greene will move from supine to sit and sit to supine , scoot up and down and roll side to side in bed with STAND BY ASSIST within 7 treatment day(s). (2.)Ms. Leah Greene will transfer from bed to chair and chair to bed with STAND BY ASSIST using the least restrictive device within 7 treatment day(s). (3.)Ms. Leah Greene will ambulate with STAND BY ASSIST for 75 feet with the least restrictive device within 7 treatment day(s). ________________________________________________________________________________________________      PHYSICAL THERAPY: Daily Note, AM 5/14/2018  INPATIENT: Hospital Day: 4  Payor: [employer] VIP CARE PLUS / [place] [employer] CARE PLUS / Product Type: Managed Care Medicare /      NAME/AGE/GENDER: Alessandro Farrell is a 80 y.o. female   PRIMARY DIAGNOSIS: Weakness  Bacteremia Bacteremia due to Streptococcus Bacteremia due to Streptococcus        ICD-10: Treatment Diagnosis:    · Generalized Muscle Weakness (M62.81)  · Difficulty in walking, Not elsewhere classified (R26.2)  · Other abnormalities of gait and mobility (R26.89)   Precaution/Allergies:  Lipitor [atorvastatin] and Pcn [penicillins]      ASSESSMENT:     Ms. Leah Greene is sitting up in chair on arrival with Sikhism members present. She is agreeable to PT and keeps telling me about when she would work.   Pt ambulated 10 feet and then had to rest for about 10 minutes before walking back to the chair. She performed LBE exercises while sitting. Pt left sitting up in chair with needs in reach and nursing assistant present. This section established at most recent assessment   PROBLEM LIST (Impairments causing functional limitations):  1. Decreased Strength  2. Decreased ADL/Functional Activities  3. Decreased Transfer Abilities  4. Decreased Ambulation Ability/Technique  5. Decreased Balance  6. Decreased Activity Tolerance  7. Decreased Cognition   INTERVENTIONS PLANNED: (Benefits and precautions of physical therapy have been discussed with the patient.)  1. Balance Exercise  2. Bed Mobility  3. Family Education  4. Gait Training  5. Therapeutic Activites  6. Therapeutic Exercise/Strengthening  7. Transfer Training  8. Group Therapy     TREATMENT PLAN: Frequency/Duration: 3 times a week for duration of hospital stay  Rehabilitation Potential For Stated Goals: Good     RECOMMENDED REHABILITATION/EQUIPMENT: (at time of discharge pending progress): Due to the probability of continued deficits (see above) this patient will likely need continued skilled physical therapy after discharge. Equipment:    Walkers, Type: Rolling Walker              HISTORY:   History of Present Injury/Illness (Reason for Referral):  Per H&P: \"  HISTORY OF PRESENT ILLNESS:   Maryann Griffith is an 80 y.o. female with a past medical history of atrial fibrillation, HTN, and hx of DVT/PE who presents to the ER with her daughter with complaint of weakness and shortness of breath over the past 2-3 days. The patient is unable to give much more information, mostly mumbling \"yes\" or \"no. \" She denies any pain except in her knees. The patient lives alone, but her daughter lives in [place] and checks in on her frequently. Earlier today, the patient slid down into her floor and could not get up, prompting an EMS call. She still feels very weak and requires assistance sitting up in bed.  Denies any fevers, chills, nausea, vomiting, diarrhea, constipation, abdominal pain, chest pain. Denies cough.     REVIEW OF SYSTEMS: Comprehensive ROS performed and negative except as stated in HPI. \"  Past Medical History/Comorbidities:   Ms. Makenna Acosta  has a past medical history of Anemia (4/21/2016); Anemia due to blood loss, acute (2014); Arthritis; Atrial fibrillation ([facility] [address] (4/21/2016); Cancer [facility]) (Jan 2002); DVT (deep venous thrombosis) ([facility] [address] (7/2014); Dyspnea on exertion; Edema; Environmental allergies; GERD (gastroesophageal reflux disease); HLD (hyperlipidemia) (4/21/2016); Hyperlipidemia; Hypertension; Hypokalemia (2014); Incisional hernia; Low serum sodium (2014); Melena (2014); PE (pulmonary embolism) (7/2014); Pulmonary HTN ([facility] [address] Tricuspid regurgitation; and Vitamin D deficiency. Ms. Makenna Acosta  has a past surgical history that includes hx bipin and bso (1971); hx urological (2002); and hx hernia repair (6/15/12). Social History/Living Environment:   Home Environment: Private residence  # Steps to Enter: 2  Rails to Enter: Yes  Hand Rails : Right  One/Two Story Residence: One story  Living Alone: Yes  Support Systems: Child(claudia), Home care staff  Patient Expects to be Discharged to[de-identified] Unknown  Current DME Used/Available at Home: Cane, quad, [employer]i Cancer, straight, Shower chair  Tub or Shower Type: Tub/Shower combination  Prior Level of Function/Work/Activity:  Lives alone with home care staff in 8 hr/day weekdays. Gets some assistance for bathing and ambulates with a cane.      Number of Personal Factors/Comorbidities that affect the Plan of Care:  Lives alone 1-2: MODERATE COMPLEXITY   EXAMINATION:   Most Recent Physical Functioning:   Gross Assessment:                  Posture:     Balance:    Bed Mobility:     Wheelchair Mobility:     Transfers:  Sit to Stand: Contact guard assistance  Stand to Sit: Contact guard assistance  Bed to Chair: Contact guard assistance  Gait:     Speed/Alena: Delayed;Pace decreased (<100 feet/min)  Step Length: Left shortened;Right shortened  Distance (ft): 10 Feet (ft) (x2)  Assistive Device: Walker, rolling  Ambulation - Level of Assistance: Contact guard assistance      Body Structures Involved:  1. Metabolic  2. Endocrine  3. Muscles Body Functions Affected:  1. Mental  2. Neuromusculoskeletal  3. Movement Related  4. Metobolic/Endocrine Activities and Participation Affected:  1. General Tasks and Demands  2. Mobility  3. Self Care  4. Domestic Life  5. Community, Social and [place]   Number of elements that affect the Plan of Care: 4+: HIGH COMPLEXITY   CLINICAL PRESENTATION:   Presentation: Stable and uncomplicated: LOW COMPLEXITY   CLINICAL DECISION MAKIN [facility] Mobility Inpatient Short Form  How much difficulty does the patient currently have. .. Unable A Lot A Little None   1. Turning over in bed (including adjusting bedclothes, sheets and blankets)? [] 1   [] 2   [] 3   [x] 4   2. Sitting down on and standing up from a chair with arms ( e.g., wheelchair, bedside commode, etc.)   [] 1   [x] 2   [] 3   [] 4   3. Moving from lying on back to sitting on the side of the bed? [] 1   [x] 2   [] 3   [] 4   How much help from another person does the patient currently need. .. Total A Lot A Little None   4. Moving to and from a bed to a chair (including a wheelchair)? [] 1   [x] 2   [] 3   [] 4   5. Need to walk in hospital room? [] 1   [x] 2   [] 3   [] 4   6. Climbing 3-5 steps with a railing? [x] 1   [] 2   [] 3   [] 4   © , Trustees of [address] [place] [address] [address] [address], under license to [employer]. All rights reserved      Score:  Initial: 13 Most Recent: X (Date: -- )    Interpretation of Tool:  Represents activities that are increasingly more difficult (i.e. Bed mobility, Transfers, Gait). Score 24 23 22-20 19-15 14-10 9-7 6     Modifier CH CI CJ CK CL CM CN      ?  Mobility - Walking and Moving Around:     - CURRENT STATUS: CL - 60%-79% impaired, limited or restricted    - GOAL STATUS: CK - 40%-59% impaired, limited or restricted    - D/C STATUS:  ---------------To be determined---------------  Payor: [employer] VIP CARE PLUS / [place] [employer] VIP CARE PLUS / Product Type: Managed Care Medicare /      Medical Necessity:     · Patient demonstrates good rehab potential due to higher previous functional level. Reason for Services/Other Comments:  · Patient continues to require skilled intervention due to decreased balance and functional mobility. Use of outcome tool(s) and clinical judgement create a POC that gives a: Clear prediction of patient's progress: LOW COMPLEXITY            TREATMENT:   (In addition to Assessment/Re-Assessment sessions the following treatments were rendered)   Pre-treatment Symptoms/Complaints:  No complaints of pain  Pain: Initial:      Post Session:  No complaints     Therapeutic Activity: (    10 minutes): Therapeutic activities including Bed transfers to improve mobility, strength and balance. Required minimal   to promote dynamic balance in standing. Therapeutic Exercise: (15 Minutes):  Exercises per grid below to improve mobility, strength and balance. Required minimal verbal cues to promote proper body alignment, promote proper body posture and promote proper body mechanics. Progressed repetitions as indicated. Braces/Orthotics/Lines/Etc:   · IV  · O2 Device: Room air  Treatment/Session Assessment:    · Response to Treatment:  Doing well with mobility. · Interdisciplinary Collaboration:   o Registered Nurse  · After treatment position/precautions:   o Up in chair  o Bed alarm/tab alert on  o Bed/Chair-wheels locked  o Call light within reach   · Compliance with Program/Exercises: Will assess as treatment progresses. · Recommendations/Intent for next treatment session: \"Next visit will focus on advancements to more challenging activities and reduction in assistance provided\".   Total Treatment Duration:  PT Patient Time In/Time Out  Time In: 1115  Time Out: [address] [place], PTA

## 2018-05-14 NOTE — CDMP QUERY
Please clarify if this patient is being treated/managed for:    SEPSIS PRESENT ON ADMISSION  in the setting of bacteremia treating with Vancomycin IV. =>Other Explanation of clinical findings  =>Unable to Determine (no explanation of clinical findings)    The medical record reflects the following:    Risk Factors: Bacteremia Group B     Clinical Indicators: WBC 12.8 with 88% neutrophils , Temp in .4,     Treatment: Vancomycin IV     Please clarify and document your clinical opinion in the progress notes and discharge summary including the definitive and/or presumptive diagnosis, (suspected or probable), related to the above clinical findings. Please include clinical findings supporting your diagnosis.     Thanks,  Ivon Zhang RN, [address] [place] [address] Documentation Management Program  [phone]

## 2018-05-14 NOTE — PROGRESS NOTES
Spoke with patient's Daughter Criselda Gonzalez about patient's several attempts to get out of bed last night and increase agitation. Daughter verbalized she understands situation. Patient is currently resting quietly. Currently on room air. [place] pad in place and lap belt on. IV fluids infusing at this time. Bed low and locked. Call light within reach. Will give SBAR report to oncoming RN.

## 2018-05-14 NOTE — PROGRESS NOTES
No changes from prior assessment patient pulled her IV. Rn attempted X1 without success. Charge nurse to place IV. No further changes at this time. SBAR report to oncoming nurse.

## 2018-05-14 NOTE — PROGRESS NOTES
Pt is alert and oriented x 1 and resting in bed. Reoriented patient and educated on IV fluids and antibiotics. Pt is on room air. Respirations are even and unlabored. Breath sounds are clear. Abd is soft and non-tender with abd sounds active in all quadrants. Pt has no c/o pain or needs expressed at this time. Pt is stable. Safety measures in place. Will continue to monitor.

## 2018-05-15 ENCOUNTER — APPOINTMENT (OUTPATIENT)
Dept: CT IMAGING | Age: 83
DRG: 871 | End: 2018-05-15
Attending: INTERNAL MEDICINE
Payer: COMMERCIAL

## 2018-05-15 LAB
ANION GAP SERPL CALC-SCNC: 12 MMOL/L (ref 7–16)
BUN SERPL-MCNC: 13 MG/DL (ref 8–23)
CALCIUM SERPL-MCNC: 10 MG/DL (ref 8.3–10.4)
CHLORIDE SERPL-SCNC: 108 MMOL/L (ref 98–107)
CO2 SERPL-SCNC: 24 MMOL/L (ref 21–32)
CREAT SERPL-MCNC: 1.15 MG/DL (ref 0.6–1)
ERYTHROCYTE [DISTWIDTH] IN BLOOD BY AUTOMATED COUNT: 15.1 % (ref 11.9–14.6)
GLUCOSE BLD STRIP.AUTO-MCNC: 146 MG/DL (ref 65–100)
GLUCOSE SERPL-MCNC: 113 MG/DL (ref 65–100)
HCT VFR BLD AUTO: 41.2 % (ref 35.8–46.3)
HGB BLD-MCNC: 13.4 G/DL (ref 11.7–15.4)
MAGNESIUM SERPL-MCNC: 1.8 MG/DL (ref 1.8–2.4)
MCH RBC QN AUTO: 26.4 PG (ref 26.1–32.9)
MCHC RBC AUTO-ENTMCNC: 32.5 G/DL (ref 31.4–35)
MCV RBC AUTO: 81.3 FL (ref 79.6–97.8)
PLATELET # BLD AUTO: 244 K/UL (ref 150–450)
PMV BLD AUTO: 11.8 FL (ref 10.8–14.1)
POTASSIUM SERPL-SCNC: 3.5 MMOL/L (ref 3.5–5.1)
RBC # BLD AUTO: 5.07 M/UL (ref 4.05–5.25)
SODIUM SERPL-SCNC: 144 MMOL/L (ref 136–145)
WBC # BLD AUTO: 5 K/UL (ref 4.3–11.1)

## 2018-05-15 PROCEDURE — 74011250636 HC RX REV CODE- 250/636: Performed by: INTERNAL MEDICINE

## 2018-05-15 PROCEDURE — 97530 THERAPEUTIC ACTIVITIES: CPT

## 2018-05-15 PROCEDURE — 80048 BASIC METABOLIC PNL TOTAL CA: CPT | Performed by: INTERNAL MEDICINE

## 2018-05-15 PROCEDURE — 74011000258 HC RX REV CODE- 258: Performed by: FAMILY MEDICINE

## 2018-05-15 PROCEDURE — 74177 CT ABD & PELVIS W/CONTRAST: CPT

## 2018-05-15 PROCEDURE — 74011000258 HC RX REV CODE- 258: Performed by: INTERNAL MEDICINE

## 2018-05-15 PROCEDURE — 85027 COMPLETE CBC AUTOMATED: CPT | Performed by: INTERNAL MEDICINE

## 2018-05-15 PROCEDURE — 74011250637 HC RX REV CODE- 250/637: Performed by: FAMILY MEDICINE

## 2018-05-15 PROCEDURE — 74011636320 HC RX REV CODE- 636/320: Performed by: FAMILY MEDICINE

## 2018-05-15 PROCEDURE — 74011250636 HC RX REV CODE- 250/636: Performed by: FAMILY MEDICINE

## 2018-05-15 PROCEDURE — 83735 ASSAY OF MAGNESIUM: CPT | Performed by: INTERNAL MEDICINE

## 2018-05-15 PROCEDURE — 74011250637 HC RX REV CODE- 250/637: Performed by: INTERNAL MEDICINE

## 2018-05-15 PROCEDURE — 36415 COLL VENOUS BLD VENIPUNCTURE: CPT | Performed by: INTERNAL MEDICINE

## 2018-05-15 PROCEDURE — 65660000000 HC RM CCU STEPDOWN

## 2018-05-15 PROCEDURE — 82962 GLUCOSE BLOOD TEST: CPT

## 2018-05-15 RX ORDER — SODIUM CHLORIDE 9 MG/ML
3 INJECTION, SOLUTION INTRAVENOUS ONCE
Status: COMPLETED | OUTPATIENT
Start: 2018-05-15 | End: 2018-05-15

## 2018-05-15 RX ORDER — SODIUM CHLORIDE 9 MG/ML
1 INJECTION, SOLUTION INTRAVENOUS AS NEEDED
Status: DISPENSED | OUTPATIENT
Start: 2018-05-15 | End: 2018-05-16

## 2018-05-15 RX ORDER — SODIUM CHLORIDE 0.9 % (FLUSH) 0.9 %
10 SYRINGE (ML) INJECTION
Status: COMPLETED | OUTPATIENT
Start: 2018-05-15 | End: 2018-05-15

## 2018-05-15 RX ADMIN — LORATADINE 10 MG: 10 TABLET ORAL at 09:07

## 2018-05-15 RX ADMIN — DRONEDARONE 400 MG: 400 TABLET, FILM COATED ORAL at 09:07

## 2018-05-15 RX ADMIN — DIATRIZOATE MEGLUMINE AND DIATRIZOATE SODIUM 15 ML: 660; 100 LIQUID ORAL; RECTAL at 15:16

## 2018-05-15 RX ADMIN — Medication 5 ML: at 15:23

## 2018-05-15 RX ADMIN — SODIUM CHLORIDE 3 ML/KG/HR: 900 INJECTION, SOLUTION INTRAVENOUS at 14:26

## 2018-05-15 RX ADMIN — DRONEDARONE 400 MG: 400 TABLET, FILM COATED ORAL at 17:35

## 2018-05-15 RX ADMIN — METOPROLOL TARTRATE 25 MG: 25 TABLET ORAL at 09:08

## 2018-05-15 RX ADMIN — METOPROLOL TARTRATE 25 MG: 25 TABLET ORAL at 17:35

## 2018-05-15 RX ADMIN — APIXABAN 2.5 MG: 2.5 TABLET, FILM COATED ORAL at 09:08

## 2018-05-15 RX ADMIN — Medication 5 ML: at 21:04

## 2018-05-15 RX ADMIN — SODIUM CHLORIDE 50 ML/HR: 450 INJECTION, SOLUTION INTRAVENOUS at 22:13

## 2018-05-15 RX ADMIN — Medication 5 ML: at 05:32

## 2018-05-15 RX ADMIN — SODIUM CHLORIDE 100 ML: 900 INJECTION, SOLUTION INTRAVENOUS at 18:09

## 2018-05-15 RX ADMIN — CEFTRIAXONE SODIUM 2 G: 2 INJECTION, POWDER, FOR SOLUTION INTRAMUSCULAR; INTRAVENOUS at 17:37

## 2018-05-15 RX ADMIN — DONEPEZIL HYDROCHLORIDE 5 MG: 5 TABLET, FILM COATED ORAL at 21:02

## 2018-05-15 RX ADMIN — AMLODIPINE BESYLATE 5 MG: 5 TABLET ORAL at 09:08

## 2018-05-15 RX ADMIN — Medication 10 ML: at 18:09

## 2018-05-15 RX ADMIN — APIXABAN 2.5 MG: 2.5 TABLET, FILM COATED ORAL at 17:34

## 2018-05-15 RX ADMIN — POTASSIUM CHLORIDE 20 MEQ: 20 TABLET, EXTENDED RELEASE ORAL at 09:08

## 2018-05-15 RX ADMIN — POTASSIUM CHLORIDE 20 MEQ: 20 TABLET, EXTENDED RELEASE ORAL at 17:35

## 2018-05-15 RX ADMIN — IOPAMIDOL 100 ML: 755 INJECTION, SOLUTION INTRAVENOUS at 18:09

## 2018-05-15 NOTE — PROGRESS NOTES
Problem: Interdisciplinary Rounds  Goal: Interdisciplinary Rounds  Interdisciplinary team rounds were held 5/15/2018 with the following team members:Care Management, Physical Therapy, Physician and Clinical Coordinator and the patient. Plan of care discussed. See clinical pathway and/or care plan for interventions and desired outcomes.

## 2018-05-15 NOTE — PROGRESS NOTES
Tele monitor room notified pt has intermittent runs of afib and aflutter along with episodes of bradycardia. [facility] [facility] notified. Will continue to monitor.

## 2018-05-15 NOTE — PROGRESS NOTES
Pt found out of bed with mitts off. Pt is agitated and a&ox1. Admin haldol per MAR. Pt is stable. Will continue to monitor.

## 2018-05-15 NOTE — PROGRESS NOTES
Pt is alert and oriented x 1  and resting in bed with visitors at bedside. Pt is on room air. Respirations are even and unlabored. Breath sounds are clear. Abd is soft and non-tender with abd sounds active in all quadrants. Pt has no c/o pain or needs expressed at this time. Pt is stable. Safety measures in place. Will continue to monitor.

## 2018-05-15 NOTE — PROGRESS NOTES
Hospitalist Progress Note    Subjective:   Daily Progress Note: 5/14/2018 10:06 AM    This is an 79 YO female patient with a PMH of afib on Eliquis and dronedarone, dementia, previous DVT and PE admitted to the hospital due to SOB and fatigue and poor appetite,  Blood culture reported GBS bacteriemia. Switched to IV ceftriaxone on 5/13. ECHO ordered and showed NO evidence of vegetations. ID consulted, source of infection NOT clear. Patient has R knee pain. Arthrocentesis recommended. Ortho consulted. Has bad sundown with severe agitation at night. Judicious use of anti-psychotics recommended because she is on dronedarone and aricept.  EKG done today is showing normal QTc, I think few doses of IV haldol prn can be safely use as needed with periodic  monitoring of QTc/EKGs     Current Facility-Administered Medications   Medication Dose Route Frequency    potassium chloride (K-DUR, KLOR-CON) SR tablet 20 mEq  20 mEq Oral BID    LORazepam (ATIVAN) injection 1 mg  1 mg IntraVENous Q6H PRN    0.45% sodium chloride infusion  50 mL/hr IntraVENous CONTINUOUS    cefTRIAXone (ROCEPHIN) 2 g in 0.9% sodium chloride (MBP/ADV) 50 mL  2 g IntraVENous Q24H    amLODIPine (NORVASC) tablet 5 mg  5 mg Oral DAILY    apixaban (ELIQUIS) tablet 2.5 mg  2.5 mg Oral BID    donepezil (ARICEPT) tablet 5 mg  5 mg Oral QHS    dronedarone (MULTAQ) tablet tab 400 mg  400 mg Oral BID WITH MEALS    loratadine (CLARITIN) tablet 10 mg  10 mg Oral DAILY    metoprolol tartrate (LOPRESSOR) tablet 25 mg  25 mg Oral BID    sodium chloride (NS) flush 5-10 mL  5-10 mL IntraVENous Q8H    sodium chloride (NS) flush 5-10 mL  5-10 mL IntraVENous PRN    acetaminophen (TYLENOL) tablet 650 mg  650 mg Oral Q4H PRN    ondansetron (ZOFRAN) injection 4 mg  4 mg IntraVENous Q4H PRN    magnesium hydroxide (MILK OF MAGNESIA) 400 mg/5 mL oral suspension 30 mL  30 mL Oral DAILY PRN        Review of Systems  A comprehensive review of systems was negative except for that written in the HPI. Objective:     Visit Vitals    /75 (BP 1 Location: Left arm, BP Patient Position: At rest)    Pulse 93    Temp 98.4 °F (36.9 °C)    Resp 20    Ht 5' 2\" (1.575 m)    Wt 67.1 kg (147 lb 14.4 oz)    SpO2 92%    BMI 27.05 kg/m2      O2 Device: Room air    Temp (24hrs), Av.2 °F (36.8 °C), Min:97.6 °F (36.4 °C), Max:98.4 °F (36.9 °C)      1901 - 05/15 0700  In: -   Out: 200 [Urine:200]  701 - 1900  In: 0220 [P.O.:1320; I.V.:900]  Out: -     Visit Vitals    /75 (BP 1 Location: Left arm, BP Patient Position: At rest)    Pulse 93    Temp 98.4 °F (36.9 °C)    Resp 20    Ht 5' 2\" (1.575 m)    Wt 67.1 kg (147 lb 14.4 oz)    SpO2 92%    BMI 27.05 kg/m2     General appearance: alert, fatigued   Head: Normocephalic, without obvious abnormality, atraumatic  Eyes: negative  Throat: Lips, mucosa, and tongue normal.   Lungs: clear to auscultation bilaterally  Heart: regular rate and rhythm, S1, S2 normal, no murmur, click, rub or gallop  Abdomen: soft, non-tender.  Bowel sounds normal. No masses,  no organomegaly  musculo-skeletal: R knee pain, mild to moderate effusion   Skin: Skin color, texture, turgor normal. No rashes or lesions  Neurologic: Grossly normal    Additional comments:None    Data Review    Recent Results (from the past 24 hour(s))   CBC W/O DIFF    Collection Time: 18  6:32 AM   Result Value Ref Range    WBC 4.7 4.3 - 11.1 K/uL    RBC 4.44 4.05 - 5.25 M/uL    HGB 11.5 (L) 11.7 - 15.4 g/dL    HCT 36.7 35.8 - 46.3 %    MCV 82.7 79.6 - 97.8 FL    MCH 25.9 (L) 26.1 - 32.9 PG    MCHC 31.3 (L) 31.4 - 35.0 g/dL    RDW 15.2 (H) 11.9 - 14.6 %    PLATELET 899 300 - 623 K/uL    MPV 11.7 10.8 - 06.1 FL   METABOLIC PANEL, BASIC    Collection Time: 18  6:32 AM   Result Value Ref Range    Sodium 148 (H) 136 - 145 mmol/L    Potassium 3.4 (L) 3.5 - 5.1 mmol/L    Chloride 113 (H) 98 - 107 mmol/L    CO2 25 21 - 32 mmol/L    Anion gap 10 7 - 16 mmol/L    Glucose 89 65 - 100 mg/dL    BUN 15 8 - 23 MG/DL    Creatinine 1.18 (H) 0.6 - 1.0 MG/DL    GFR est AA 56 (L) >60 ml/min/1.73m2    GFR est non-AA 46 (L) >60 ml/min/1.73m2    Calcium 9.4 8.3 - 10.4 MG/DL   EKG, 12 LEAD, INITIAL    Collection Time: 05/14/18 10:01 AM   Result Value Ref Range    Ventricular Rate 62 BPM    Atrial Rate 62 BPM    P-R Interval 136 ms    QRS Duration 90 ms    Q-T Interval 424 ms    QTC Calculation (Bezet) 430 ms    Calculated P Axis 66 degrees    Calculated R Axis 20 degrees    Calculated T Axis 53 degrees    Diagnosis       Normal sinus rhythm short burst atrial flutter   Normal ECG  When compared with ECG of 24-AUG-2014 02:47,  Previous ECG has undetermined rhythm, needs review  ST no longer depressed in Inferior leads  ST no longer depressed in Anterolateral leads  Nonspecific T wave abnormality no longer evident in Inferior leads  T wave inversion no longer evident in Anterior leads  Confirmed by CEBE  MD (), Aaron Auguste ([id number]) on 5/14/2018 11:51:26 AM     CELL COUNT, BODY FLUID    Collection Time: 05/14/18 12:40 PM   Result Value Ref Range    BODY FLUID TYPE JOINT FLUID      FLUID COLOR PINK      FLUID APPEARANCE CLOUDY      FLUID RBC CT. 26517 /cu mm    FLUID WBC COUNT 319 /cu mm    FLD NEUTROPHILS 41 %    FLD LYMPHS 59 %   CRYSTALS, SYNOVIAL FLUID    Collection Time: 05/14/18 12:40 PM   Result Value Ref Range    FLUID TYPE(7) SYNOVIAL FLUID      Crystals, body fluid PENDING          Assessment/Plan:     Principal Problem:    Bacteremia due to Streptococcus (5/12/2018)    Active Problems:    Pulmonary embolism ([address] (7/30/2014)      Overview: 7/30/14 VQ scan:      1. Abnormal perfusion defect involving the entire right upper lobe. The       right       upper lobe contains 3 segments and therefore this would be consistent with       3       large perfusion defects. Therefore, this would be considered a high       probability       for pulmonary embolism study.       HTN (hypertension) (7/30/2014)      Overview: Echo (1/22/15):  EF 50-55%. AVSC. Mild/moderate TR.  RVSP 50      DVT (deep venous thrombosis) ([address] (8/1/2014)      Overview: 1. LE Ultrasound (7/31/14) : Occlusive and nonocclusive thrombus within       the deep venous system of the left lower extremity. Pulmonary hypertension ([address] (8/4/2014)      CKD (chronic kidney disease) stage 5, GFR less than 15 ml/min ([address] (7/9/2015)      Overview: Followed by Dr. Jackie Saravia. Prior left nephrectomy. HLD (hyperlipidemia) (4/21/2016)      Atrial fibrillation ([address] (4/21/2016)      Weakness (5/11/2018)      Leukocytosis (5/11/2018)      Bacteremia (5/12/2018)          # GBS bacteriemia  -unclear etiology   -ECHO with NO vegetations   -initially on Vanco. Switched to IV ceftriaxone on 5/13    -ID consulted , Unclear source of bacteriemia. R arthrocentesis recommended.  If this is negative, abdomen/pelvis CT scan recommended     # Afib   -controlled   -on eliquis   -on dronedarone     #CKD  -patient has history of nephrectomy   -stable   -will monitor     #HTN  -stable     #hypernatremia  -On  IV 1/2 NS    #delirium   -QTc is normal   -IV haldol QHS prn for severe sundown   -low dose ativan prn     DVT PPX: on eliquis     Signed By: Deshaun Clay MD     May 14, 2018

## 2018-05-15 NOTE — PROGRESS NOTES
Hospitalist Progress Note     Admit Date:  2018 11:34 AM   Name:  Zehra Rogers   Age:  80 y.o.  :  1931   MRN:  [id number]   PCP:  Marsha Carson MD  Treatment Team: Attending Provider: Manuel Merrill MD; Utilization Review: Esthela Pedroza RN; Consulting Provider: Eleni Jurado MD; Consulting Provider: Selam Bueno MD; Care Manager: Kalli Iverson. Jeana Husain; Utilization Review: Betty Medrano    Subjective: This is an 81 YO female patient with a PMH of afib on Eliquis and dronedarone, dementia, previous DVT and PE admitted to the hospital due to SOB and fatigue and poor appetite,  Blood culture reported GBS bacteriemia. Switched to IV ceftriaxone on . ECHO ordered and showed NO evidence of vegetations. ID consulted, source of infection NOT clear. Patient has R knee pain. Arthrocentesis recommended. Ortho consulted. Has bad sundown with severe agitation at night.     5/15/18  Pt awake,alert,says doing ok      Objective:   Patient Vitals for the past 24 hrs:   Temp Pulse Resp BP SpO2   05/15/18 1533 98.1 °F (36.7 °C) 67 20 127/67 97 %   05/15/18 1140 97.8 °F (36.6 °C) (!) 54 20 117/67 100 %   05/15/18 0735 98.1 °F (36.7 °C) 68 20 (!) 149/94 98 %   05/15/18 0308 98.6 °F (37 °C) (!) 101 20 155/78 97 %   18 2323 98.1 °F (36.7 °C) 75 20 110/67 95 %     Oxygen Therapy  O2 Sat (%): 97 % (05/15/18 1533)  Pulse via Oximetry: 106 beats per minute (18 2131)  O2 Device: Room air (18 1140)    Intake/Output Summary (Last 24 hours) at 05/15/18 1916  Last data filed at 05/15/18 1821   Gross per 24 hour   Intake             1640 ml   Output              200 ml   Net             1440 ml         General:    Well nourished. Alert.    heent- normal  CV:   RRR. No murmur, rub, or gallop. Lungs:   Mild crep basal, no wheezing  Abdomen:   Soft, nontender, nondistended. Cns- no focal neurological deficits  Extremities: Warm and dry. No cyanosis or edema.    Skin:     No rashes or jaundice. Data Review:  I have reviewed all labs, meds, telemetry events, and studies from the last 24 hours.     Recent Results (from the past 24 hour(s))   CBC W/O DIFF    Collection Time: 05/15/18  6:29 AM   Result Value Ref Range    WBC 5.0 4.3 - 11.1 K/uL    RBC 5.07 4.05 - 5.25 M/uL    HGB 13.4 11.7 - 15.4 g/dL    HCT 41.2 35.8 - 46.3 %    MCV 81.3 79.6 - 97.8 FL    MCH 26.4 26.1 - 32.9 PG    MCHC 32.5 31.4 - 35.0 g/dL    RDW 15.1 (H) 11.9 - 14.6 %    PLATELET 599 007 - 152 K/uL    MPV 11.8 10.8 - 43.3 FL   METABOLIC PANEL, BASIC    Collection Time: 05/15/18  6:29 AM   Result Value Ref Range    Sodium 144 136 - 145 mmol/L    Potassium 3.5 3.5 - 5.1 mmol/L    Chloride 108 (H) 98 - 107 mmol/L    CO2 24 21 - 32 mmol/L    Anion gap 12 7 - 16 mmol/L    Glucose 113 (H) 65 - 100 mg/dL    BUN 13 8 - 23 MG/DL    Creatinine 1.15 (H) 0.6 - 1.0 MG/DL    GFR est AA 58 (L) >60 ml/min/1.73m2    GFR est non-AA 48 (L) >60 ml/min/1.73m2    Calcium 10.0 8.3 - 10.4 MG/DL   MAGNESIUM    Collection Time: 05/15/18  6:29 AM   Result Value Ref Range    Magnesium 1.8 1.8 - 2.4 mg/dL   GLUCOSE, POC    Collection Time: 05/15/18  3:31 PM   Result Value Ref Range    Glucose (POC) 146 (H) 65 - 100 mg/dL        All Micro Results     Procedure Component Value Units Date/Time    CULTURE, BODY FLUID Tracy Salinas STAIN [[id number]] Collected:  05/14/18 1240    Order Status:  Completed Specimen:  Joint Fluid Updated:  05/15/18 5382     Special Requests: NO SPECIAL REQUESTS        GRAM STAIN NO WBCS SEEN         NO DEFINITE ORGANISM SEEN        Culture result: NO GROWTH 1 DAY       CULTURE, BLOOD [[id number]] Collected:  05/13/18 1125    Order Status:  Completed Specimen:  Blood from Blood Updated:  05/15/18 0731     Special Requests: --        LEFT  Antecubital       Culture result: NO GROWTH 2 DAYS       CULTURE, BLOOD [[id number]] Collected:  05/13/18 1131    Order Status:  Completed Specimen:  Blood from Blood Updated:  05/15/18 0731 Special Requests: --        RIGHT  HAND       Culture result: NO GROWTH 2 DAYS       Coy Shell [[id number]] Collected:  05/14/18 1240    Order Status:  Canceled Specimen:  Joint Fluid     CULTURE, BLOOD [[id number]]  (Abnormal) Collected:  05/11/18 1259    Order Status:  Completed Specimen:  Blood from Blood Updated:  05/13/18 0622     Special Requests: --        RIGHT  Antecubital       GRAM STAIN GRAM POSITIVE COCCI                 AEROBIC AND ANAEROBIC BOTTLES              CRITICAL RESULT NOT CALLED DUE TO PREVIOUS NOTIFICATION OF CRITICAL RESULT WITHIN THE LAST 24 HOURS.      Culture result:         STREPTOCOCCUS AGALACTIAE SERO GROUP B (A)            For Susceptibility Refer to Culture  ACCESSION [id number]      CULTURE, BLOOD [[id number]]  (Abnormal)  (Susceptibility) Collected:  05/11/18 1259    Order Status:  Completed Specimen:  Blood from Blood Updated:  05/13/18 0622     Special Requests: --        LEFT  Antecubital       GRAM STAIN GRAM POSITIVE COCCI                 AEROBIC AND ANAEROBIC BOTTLES              RESULTS VERIFIED, PHONED TO AND READ BACK BY Florencia Alex RN ON 05/11/2018 AT 2242 WMR     Culture result:         STREPTOCOCCUS AGALACTIAE SERO GROUP B (A)          Current Meds:  Current Facility-Administered Medications   Medication Dose Route Frequency    0.9% sodium chloride infusion  1 mL/kg/hr IntraVENous PRN    potassium chloride (K-DUR, KLOR-CON) SR tablet 20 mEq  20 mEq Oral BID    LORazepam (ATIVAN) injection 1 mg  1 mg IntraVENous Q6H PRN    haloperidol lactate (HALDOL) injection 2 mg  2 mg IntraVENous QHS PRN    0.45% sodium chloride infusion  50 mL/hr IntraVENous CONTINUOUS    cefTRIAXone (ROCEPHIN) 2 g in 0.9% sodium chloride (MBP/ADV) 50 mL  2 g IntraVENous Q24H    amLODIPine (NORVASC) tablet 5 mg  5 mg Oral DAILY    apixaban (ELIQUIS) tablet 2.5 mg  2.5 mg Oral BID    donepezil (ARICEPT) tablet 5 mg  5 mg Oral QHS    dronedarone (MULTAQ) tablet tab 400 mg  400 mg Oral BID WITH MEALS    loratadine (CLARITIN) tablet 10 mg  10 mg Oral DAILY    metoprolol tartrate (LOPRESSOR) tablet 25 mg  25 mg Oral BID    sodium chloride (NS) flush 5-10 mL  5-10 mL IntraVENous Q8H    sodium chloride (NS) flush 5-10 mL  5-10 mL IntraVENous PRN    acetaminophen (TYLENOL) tablet 650 mg  650 mg Oral Q4H PRN    ondansetron (ZOFRAN) injection 4 mg  4 mg IntraVENous Q4H PRN    magnesium hydroxide (MILK OF MAGNESIA) 400 mg/5 mL oral suspension 30 mL  30 mL Oral DAILY PRN       Other Studies (last 24 hours):  Ct Abd Pelv W Cont    Result Date: 5/15/2018  CT of the Abdomen and Pelvis with contrast CLINICAL INDICATION:  Acute fever and leukocytosis, group B streptococcus bacteremia, lower extremity pain and swelling, evaluate for septic arthritis; history also of DVT, hypertension, PE, atrial fibrillation, chronic kidney disease, kidney cancer and left nephrectomy, abdominal aortic aneurysm, partial hysterectomy COMPARISON: 8/7/2017, 7/30/2012 TECHNIQUE: Automated exposure Control was used. Multiple axial images were obtained through the abdomen and pelvis after intravenous injection of 125cc of isovue 370. Oral contrast given for evaluation of GI tract. Coronal reformatted images obtained for further evaluation of organs. FINDINGS: The partially seen lung bases demonstrated a mild posterior right lower lobe infiltrate and scattered bilateral mild atelectasis. The partially visualized heart is enlarged. Abdomen: There are no suspicious lesions in the liver or spleen. The adrenal glands demonstrate chronic nonspecific thickening, unchanged. Pancreas and gallbladder are unremarkable. Left nephrectomy again noted without obvious complication. Small hypodensities in the renal cortices are too small to characterize, statistically benign. There is normal enhancement of the right kidney, no hydronephrosis. No lymphadenopathy, free air, focal inflammatory changes or fluid collections in the abdomen.  Aorta again appears tortuous with diffuse atherosclerotic change and a distal 2.8 cm aneurysm similar to prior. There is no evidence of bowel obstruction. Moderately prominent stool in the large bowel. Appendix not definitely seen. Stomach moderately distended with debris. Surgical staples are noted in the anterior abdominal wall. Pelvis:  No acute inflammatory changes or fluid collections in the pelvis. Uterus is absent. Left ovary is unchanged. No developing lymphadenopathy or mass. No acute osseous lesions are seen. IMPRESSION: 1. No acute abnormality in the abdomen or pelvis. 2. Mild right lower lung infiltrate. 3. Chronic changes including left nephrectomy, atherosclerotic vascular disease, partial hysterectomy, abdominal aortic aneurysm, constipation. Assessment and Plan:     Hospital Problems as of 5/15/2018  Date Reviewed: 7/8/2016          Codes Class Noted - Resolved POA    * (Principal)Bacteremia due to Streptococcus ICD-10-CM: R78.81, B95.5  ICD-9-CM: 790.7, 041.00  5/12/2018 - Present Unknown        Bacteremia ICD-10-CM: R78.81  ICD-9-CM: 790.7  5/12/2018 - Present Unknown        Weakness ICD-10-CM: R53.1  ICD-9-CM: 780.79  5/11/2018 - Present Yes        Leukocytosis ICD-10-CM: D72.829  ICD-9-CM: 288.60  5/11/2018 - Present Yes        HLD (hyperlipidemia) ICD-10-CM: E78.5  ICD-9-CM: 272.4  4/21/2016 - Present Yes        Atrial fibrillation (HCC) ICD-10-CM: I48.91  ICD-9-CM: 427.31  4/21/2016 - Present Yes        CKD (chronic kidney disease) stage 5, GFR less than 15 ml/min (HCC) ICD-10-CM: N18.5  ICD-9-CM: 585.5  7/9/2015 - Present Yes    Overview Addendum 4/21/2016  8:04 PM by Jackelyn Sue MD     Followed by Dr. Nancy Gusman. Prior left nephrectomy.              Pulmonary hypertension (HCC) (Chronic) ICD-10-CM: I27.20  ICD-9-CM: 416.8  8/4/2014 - Present Yes        DVT (deep venous thrombosis) (HCC) (Chronic) ICD-10-CM: I82.409  ICD-9-CM: 453.40  8/1/2014 - Present Yes    Overview Addendum 4/27/2017 2:00 PM by Bridgette Andrews MD     1. LE Ultrasound (7/31/14) : Occlusive and nonocclusive thrombus within the deep venous system of the left lower extremity. Pulmonary embolism (HCC) (Chronic) ICD-10-CM: I26.99  ICD-9-CM: 415.19  7/30/2014 - Present Yes    Overview Signed 8/1/2014  7:36 AM by Karen Guzman NP     7/30/14 VQ scan:  1. Abnormal perfusion defect involving the entire right upper lobe. The right   upper lobe contains 3 segments and therefore this would be consistent with 3   large perfusion defects. Therefore, this would be considered a high probability   for pulmonary embolism study. HTN (hypertension) (Chronic) ICD-10-CM: I10  ICD-9-CM: 401.9  7/30/2014 - Present Yes    Overview Signed 4/27/2017  2:01 PM by Bridgette Andrews MD     Echo (1/22/15):  EF 50-55%. AVSC. Mild/moderate TR.  RVSP 50                   PLAN:    -STREPTOCOCCUS AGALACTIAE SERO GROUP B (A)- bacteremia- on rocephin-- echo no vegetations-  - acute on ckd 3--  -rt knee aspirate- pending culture  - ams- resolved  -Hx cerviocal/renal cancer, s/p chemo/XRT, left nephrectomy 2002  - afib on eliquis  - htn  - hypernatremia- resolved.     DC planning/Dispo:    DVT ppx:  eliquis    Signed:  Rolando Barnett MD

## 2018-05-15 NOTE — PROGRESS NOTES
Called Daughter Maria Kinsey and made aware that patient is moved to room 837, closer to nurses station.

## 2018-05-15 NOTE — PROGRESS NOTES
OT Note:    OT attempted to see patient this afternoon for therapy. Pt had just returned to supine with staff. OT will re-attempt to see patient at a later date/time.     Thanks,  Ehsan Macias Slight

## 2018-05-15 NOTE — PROGRESS NOTES
Pt is oriented x1 and restless. Respirations are even and unlabored. Pt has no c/o pain or needs expressed at this time. Pt is stable. Safety measures in place. Will continue to monitor.

## 2018-05-15 NOTE — PROGRESS NOTES
Infectious Disease Progress Note    Today's Date: 5/15/2018   Admit Date: 2018    Impression:   · Group B strep bacteremia (), source unknown -  BCX negative;  TTE: mild MR, mod TR; no veg  · R knee pain - s/p R knee aspirate, , 319 WBCs; Cx: NGTD  · Fever/leukocytosis: resolved  · AMS/delirium - improved  · Hx cerviocal/renal cancer, s/p chemo/XRT, left nephrectomy   · CKD 3, CrCl ~30  · PCN allergy: rash/skin peeling    Plan:   · Continue ceftriaxone for now. · Check CT abd/pelvis since we have no clear source. · Follow repeat blood cultures. Anti-infectives:   Ceftriaxone -  Vanc -    Subjective:   Date of Consultation:  May 15, 2018  Referring Physician: Dr Garcia Sayboo    No fever; sitting in chair; not restrained; Allergies   Allergen Reactions    Lipitor [Atorvastatin] Unable to Obtain    Pcn [Penicillins] Rash     With peeling skin        Review of Systems:  Pertinent items are noted in the History of Present Illness. Objective:     Visit Vitals    /67 (BP 1 Location: Left arm, BP Patient Position: Sitting)    Pulse (!) 54    Temp 97.8 °F (36.6 °C)    Resp 20    Ht 5' 2\" (1.575 m)    Wt 67.4 kg (148 lb 11.2 oz)    SpO2 100%    BMI 27.2 kg/m2     Temp (24hrs), Av.2 °F (36.8 °C), Min:97.8 °F (36.6 °C), Max:98.6 °F (37 °C)       Lines:  Peripheral IV:  LEft arm intact          Physical Exam:    General:  Alert, cooperative, sitting in chair;   Eyes:  Sclera anicteric. Mouth/Throat: oral pharynx clear   Neck: Supple   Lungs:   Clear to auscultation bilaterally, good effort   CV:  Regular rate and rhythm,no murmur, click, rub or gallop   Abdomen:   Soft, non-tender. non-distended   Extremities: No cyanosis, R knee with mild swelling / pain;   Skin: no acute rash or lesions   Lymph nodes:    Musculoskeletal: No swelling or deformity   Lines/Devices:  Intact, no erythema, drainage or tenderness   Psych: Alert and responsive;       Data Review: CBC:  Recent Labs      05/15/18   0629  05/14/18   0632  05/13/18 0732   WBC  5.0  4.7  6.6   GRANS   --    --   72   MONOS   --    --   9   EOS   --    --   4   ANEU   --    --   4.8   ABL   --    --   1.0   HGB  13.4  11.5*  11.5*   HCT  41.2  36.7  36.0   PLT  244  177  160       BMP:  Recent Labs      05/15/18   0629  05/14/18   0632  05/13/18   0732   CREA  1.15*  1.18*  1.18*   BUN  13  15  15   NA  144  148*  147*   K  3.5  3.4*  3.4*   CL  108*  113*  113*   CO2  24  25  26   AGAP  12  10  8   GLU  113*  89  86       LFTS:  No results for input(s): TBILI, ALT, SGOT, AP, TP, ALB in the last 72 hours.     Microbiology:     All Micro Results     Procedure Component Value Units Date/Time    CULTURE, BODY FLUID Beauty Mink STAIN [[id number]] Collected:  05/14/18 1240    Order Status:  Completed Specimen:  Joint Fluid Updated:  05/15/18 0733     Special Requests: NO SPECIAL REQUESTS        GRAM STAIN NO WBCS SEEN         NO DEFINITE ORGANISM SEEN        Culture result: NO GROWTH 1 DAY       CULTURE, BLOOD [[id number]] Collected:  05/13/18 1125    Order Status:  Completed Specimen:  Blood from Blood Updated:  05/15/18 0731     Special Requests: --        LEFT  Antecubital       Culture result: NO GROWTH 2 DAYS       CULTURE, BLOOD [[id number]] Collected:  05/13/18 1131    Order Status:  Completed Specimen:  Blood from Blood Updated:  05/15/18 0731     Special Requests: --        RIGHT  HAND       Culture result: NO GROWTH 2 DAYS       Mary Kate Emerald STAIN [[id number]] Collected:  05/14/18 1240    Order Status:  Canceled Specimen:  Joint Fluid     CULTURE, BLOOD [[id number]]  (Abnormal) Collected:  05/11/18 1259    Order Status:  Completed Specimen:  Blood from Blood Updated:  05/13/18 0622     Special Requests: --        RIGHT  Antecubital       GRAM STAIN GRAM POSITIVE COCCI                 AEROBIC AND ANAEROBIC BOTTLES              CRITICAL RESULT NOT CALLED DUE TO PREVIOUS NOTIFICATION OF CRITICAL RESULT WITHIN THE LAST 24 HOURS.     Culture result:         STREPTOCOCCUS AGALACTIAE SERO GROUP B (A)            For Susceptibility Refer to Culture  ACCESSION [id number]      CULTURE, BLOOD [[id number]]  (Abnormal)  (Susceptibility) Collected:  05/11/18 1259    Order Status:  Completed Specimen:  Blood from Blood Updated:  05/13/18 0622     Special Requests: --        LEFT  Antecubital       GRAM STAIN GRAM POSITIVE COCCI                 AEROBIC AND ANAEROBIC BOTTLES              RESULTS VERIFIED, PHONED TO AND READ BACK BY Kim Walker RN ON 05/11/2018 AT 2242 WMR     Culture result:         STREPTOCOCCUS AGALACTIAE SERO GROUP B (A)          Imaging:   No new imaging    Signed By: Nivia Osler, MD     May 15, 2018

## 2018-05-15 NOTE — PROGRESS NOTES
Patient is alert and oriented. Respirations present.  No complaints at this time  Will continue to monitor

## 2018-05-15 NOTE — PROGRESS NOTES
Returned to room from CT  Iv fluid resumed  To run 3hr post CT  Then resume . 45% saline  Remains confused and calm

## 2018-05-15 NOTE — PROGRESS NOTES
Problem: Mobility Impaired (Adult and Pediatric)  Goal: *Acute Goals and Plan of Care (Insert Text)  STG:  (1.)Ms. Mo Manuel will move from supine to sit and sit to supine , scoot up and down and roll side to side with MINIMAL ASSIST within 3 treatment day(s). (2.)Ms. Mo Manuel will transfer from bed to chair and chair to bed with MINIMAL ASSIST using the least restrictive device within 3 treatment day(s). MET 5/14/18  (3.)Ms. Mo Manuel will ambulate with MINIMAL ASSIST for 40 feet with the least restrictive device within 3 treatment day(s). LTG:  (1.)Ms. Mo Manuel will move from supine to sit and sit to supine , scoot up and down and roll side to side in bed with STAND BY ASSIST within 7 treatment day(s). (2.)Ms. Mo Manuel will transfer from bed to chair and chair to bed with STAND BY ASSIST using the least restrictive device within 7 treatment day(s). (3.)Ms. Mo Manuel will ambulate with STAND BY ASSIST for 75 feet with the least restrictive device within 7 treatment day(s). ________________________________________________________________________________________________      PHYSICAL THERAPY: Daily Note, Treatment Day: 1st, AM 5/15/2018  INPATIENT: Hospital Day: 5  Payor: [employer] C[employer] PLUS / [place] [employer] [place] / Product Type: Managed Care Medicare /      NAME/AGE/GENDER: Cecilia Mcdonald is a 80 y.o. female   PRIMARY DIAGNOSIS: Weakness  Bacteremia Bacteremia due to Streptococcus Bacteremia due to Streptococcus        ICD-10: Treatment Diagnosis:    · Generalized Muscle Weakness (M62.81)  · Difficulty in walking, Not elsewhere classified (R26.2)  · Other abnormalities of gait and mobility (R26.89)   Precaution/Allergies:  Lipitor [atorvastatin] and Pcn [penicillins]      ASSESSMENT:     Ms. Mo Manuel is supine, sleepy and says she is willing to walk with me but makes no move toward EOB.   She required a good bit of coaxing to start moving and today needed a good amount of help to get to the EOB where yesterday did it with SBA. She stood into walker with minimal assist but did not feel comfortable walking any further day with her due to her lethargy. No progress made today. This section established at most recent assessment   PROBLEM LIST (Impairments causing functional limitations):  1. Decreased Strength  2. Decreased ADL/Functional Activities  3. Decreased Transfer Abilities  4. Decreased Ambulation Ability/Technique  5. Decreased Balance  6. Decreased Activity Tolerance  7. Decreased Cognition   INTERVENTIONS PLANNED: (Benefits and precautions of physical therapy have been discussed with the patient.)  1. Balance Exercise  2. Bed Mobility  3. Family Education  4. Gait Training  5. Therapeutic Activites  6. Therapeutic Exercise/Strengthening  7. Transfer Training  8. Group Therapy     TREATMENT PLAN: Frequency/Duration: 3 times a week for duration of hospital stay  Rehabilitation Potential For Stated Goals: Good     RECOMMENDED REHABILITATION/EQUIPMENT: (at time of discharge pending progress): Due to the probability of continued deficits (see above) this patient will likely need continued skilled physical therapy after discharge. Equipment:    Walkers, Type: Rolling Walker              HISTORY:   History of Present Injury/Illness (Reason for Referral):  Per H&P: \"  HISTORY OF PRESENT ILLNESS:   Fran Prado is an 80 y.o. female with a past medical history of atrial fibrillation, HTN, and hx of DVT/PE who presents to the ER with her daughter with complaint of weakness and shortness of breath over the past 2-3 days. The patient is unable to give much more information, mostly mumbling \"yes\" or \"no. \" She denies any pain except in her knees. The patient lives alone, but her daughter lives in [place] and checks in on her frequently. Earlier today, the patient slid down into her floor and could not get up, prompting an EMS call.  She still feels very weak and requires assistance sitting up in bed. Denies any fevers, chills, nausea, vomiting, diarrhea, constipation, abdominal pain, chest pain. Denies cough.     REVIEW OF SYSTEMS: Comprehensive ROS performed and negative except as stated in HPI. \"  Past Medical History/Comorbidities:   Ms. Mo Manuel  has a past medical history of Anemia (4/21/2016); Anemia due to blood loss, acute (2014); Arthritis; Atrial fibrillation ([facility] (4/21/2016); Cancer [facility]) (Jan 2002); DVT (deep venous thrombosis) ([facility] (7/2014); Dyspnea on exertion; Edema; Environmental allergies; GERD (gastroesophageal reflux disease); HLD (hyperlipidemia) (4/21/2016); Hyperlipidemia; Hypertension; Hypokalemia (2014); Incisional hernia; Low serum sodium (2014); Melena (2014); PE (pulmonary embolism) (7/2014); Pulmonary HTN ([facility] Tricuspid regurgitation; and Vitamin D deficiency. Ms. Mo Manuel  has a past surgical history that includes hx bipin and bso (1971); hx urological (2002); and hx hernia repair (6/15/12). Social History/Living Environment:   Home Environment: Private residence  # Steps to Enter: 2  Rails to Enter: Yes  Hand Rails : Right  One/Two Story Residence: One story  Living Alone: Yes  Support Systems: Child(claudia), Home care staff  Patient Expects to be Discharged to[de-identified] Unknown  Current DME Used/Available at Home: Cane, quad, Alexander Bernstein, straight, Shower chair  Tub or Shower Type: Tub/Shower combination  Prior Level of Function/Work/Activity:  Lives alone with home care staff in 8 hr/day weekdays. Gets some assistance for bathing and ambulates with a cane. Number of Personal Factors/Comorbidities that affect the Plan of Care:  Lives alone 1-2: MODERATE COMPLEXITY   EXAMINATION:   Most Recent Physical Functioning:   Gross Assessment:                  Posture:     Balance:    Bed Mobility:  Supine to Sit: Moderate assistance  Scooting:  Moderate assistance  Wheelchair Mobility:     Transfers:  Sit to Stand: Minimum assistance  Stand to Sit: Contact guard assistance  Gait:     Distance (ft): 3 Feet (ft) (bed to chair)  Assistive Device: Walker, rolling  Ambulation - Level of Assistance: Contact guard assistance      Body Structures Involved:  1. Metabolic  2. Endocrine  3. Muscles Body Functions Affected:  1. Mental  2. Neuromusculoskeletal  3. Movement Related  4. Metobolic/Endocrine Activities and Participation Affected:  1. General Tasks and Demands  2. Mobility  3. Self Care  4. Domestic Life  5. Community, Social and [place]   Number of elements that affect the Plan of Care: 4+: HIGH COMPLEXITY   CLINICAL PRESENTATION:   Presentation: Stable and uncomplicated: LOW COMPLEXITY   CLINICAL DECISION MAKIN [facility] Mobility Inpatient Short Form  How much difficulty does the patient currently have. .. Unable A Lot A Little None   1. Turning over in bed (including adjusting bedclothes, sheets and blankets)? [] 1   [] 2   [] 3   [x] 4   2. Sitting down on and standing up from a chair with arms ( e.g., wheelchair, bedside commode, etc.)   [] 1   [x] 2   [] 3   [] 4   3. Moving from lying on back to sitting on the side of the bed? [] 1   [x] 2   [] 3   [] 4   How much help from another person does the patient currently need. .. Total A Lot A Little None   4. Moving to and from a bed to a chair (including a wheelchair)? [] 1   [x] 2   [] 3   [] 4   5. Need to walk in hospital room? [] 1   [x] 2   [] 3   [] 4   6. Climbing 3-5 steps with a railing? [x] 1   [] 2   [] 3   [] 4   © , Trustees of [address] [place] [address] [address] [address], under license to [employer]. All rights reserved      Score:  Initial: 13 Most Recent: X (Date: -- )    Interpretation of Tool:  Represents activities that are increasingly more difficult (i.e. Bed mobility, Transfers, Gait). Score 24 23 22-20 19-15 14-10 9-7 6     Modifier CH CI CJ CK CL CM CN      ?  Mobility - Walking and Moving Around:     - CURRENT STATUS: CL - 60%-79% impaired, limited or restricted    - GOAL STATUS: CK - 40%-59% impaired, limited or restricted    - D/C STATUS:  ---------------To be determined---------------  Payor: [employer] VIP CARE PLUS / [place] [employer] [place]US / Product Type: Managed Care Medicare /      Medical Necessity:     · Patient demonstrates good rehab potential due to higher previous functional level. Reason for Services/Other Comments:  · Patient continues to require skilled intervention due to decreased balance and functional mobility. Use of outcome tool(s) and clinical judgement create a POC that gives a: Clear prediction of patient's progress: LOW COMPLEXITY            TREATMENT:   (In addition to Assessment/Re-Assessment sessions the following treatments were rendered)   Pre-treatment Symptoms/Complaints:  No complaints of pain  Pain: Initial:   Pain Intensity 1: 0  Post Session:  No complaints     Therapeutic Activity: (    25 minutes): Therapeutic activities including Bed transfers, Chair transfers and Ambulation on level ground to improve mobility, strength, balance and endurance. Required minimal   to promote static and dynamic balance in standing but moderate assist for bed mobility. Braces/Orthotics/Lines/Etc:   · IV  · O2 Device: Room air  Treatment/Session Assessment:    · Response to Treatment:  Not as well today due to lethargy  · Interdisciplinary Collaboration:   o Physical Therapy Assistant  o Registered Nurse  · After treatment position/precautions:   o Up in chair  o Bed alarm/tab alert on  o Bed/Chair-wheels locked  o Call light within reach  o RN notified   · Compliance with Program/Exercises: Will assess as treatment progresses. · Recommendations/Intent for next treatment session: \"Next visit will focus on advancements to more challenging activities and reduction in assistance provided\".   Total Treatment Duration:  PT Patient Time In/Time Out  Time In: 1115  Time Out: Cash 75, PTA

## 2018-05-15 NOTE — PROGRESS NOTES
Pt found with IV removed from left hand. Pt states \"i need to get out of this bed. \"Reoriented pt. Will continue to monitor.

## 2018-05-16 LAB
ANION GAP SERPL CALC-SCNC: 10 MMOL/L (ref 7–16)
BACTERIA SPEC CULT: NORMAL
BUN SERPL-MCNC: 19 MG/DL (ref 8–23)
CALCIUM SERPL-MCNC: 9.7 MG/DL (ref 8.3–10.4)
CHLORIDE SERPL-SCNC: 108 MMOL/L (ref 98–107)
CO2 SERPL-SCNC: 25 MMOL/L (ref 21–32)
CREAT SERPL-MCNC: 1.5 MG/DL (ref 0.6–1)
GLUCOSE BLD STRIP.AUTO-MCNC: 117 MG/DL (ref 65–100)
GLUCOSE BLD STRIP.AUTO-MCNC: 217 MG/DL (ref 65–100)
GLUCOSE SERPL-MCNC: 102 MG/DL (ref 65–100)
GRAM STN SPEC: NORMAL
GRAM STN SPEC: NORMAL
POTASSIUM SERPL-SCNC: 4.3 MMOL/L (ref 3.5–5.1)
SERVICE CMNT-IMP: NORMAL
SODIUM SERPL-SCNC: 143 MMOL/L (ref 136–145)

## 2018-05-16 PROCEDURE — 65660000000 HC RM CCU STEPDOWN

## 2018-05-16 PROCEDURE — 97150 GROUP THERAPEUTIC PROCEDURES: CPT

## 2018-05-16 PROCEDURE — 74011250636 HC RX REV CODE- 250/636: Performed by: INTERNAL MEDICINE

## 2018-05-16 PROCEDURE — 74011000258 HC RX REV CODE- 258: Performed by: INTERNAL MEDICINE

## 2018-05-16 PROCEDURE — 36415 COLL VENOUS BLD VENIPUNCTURE: CPT | Performed by: FAMILY MEDICINE

## 2018-05-16 PROCEDURE — 80048 BASIC METABOLIC PNL TOTAL CA: CPT | Performed by: FAMILY MEDICINE

## 2018-05-16 PROCEDURE — 82962 GLUCOSE BLOOD TEST: CPT

## 2018-05-16 PROCEDURE — 74011250637 HC RX REV CODE- 250/637: Performed by: FAMILY MEDICINE

## 2018-05-16 PROCEDURE — 97110 THERAPEUTIC EXERCISES: CPT

## 2018-05-16 PROCEDURE — 02HV33Z INSERTION OF INFUSION DEVICE INTO SUPERIOR VENA CAVA, PERCUTANEOUS APPROACH: ICD-10-PCS | Performed by: INTERNAL MEDICINE

## 2018-05-16 PROCEDURE — 74011250637 HC RX REV CODE- 250/637: Performed by: INTERNAL MEDICINE

## 2018-05-16 PROCEDURE — 36569 INSJ PICC 5 YR+ W/O IMAGING: CPT | Performed by: INTERNAL MEDICINE

## 2018-05-16 PROCEDURE — 97530 THERAPEUTIC ACTIVITIES: CPT

## 2018-05-16 RX ADMIN — POTASSIUM CHLORIDE 20 MEQ: 20 TABLET, EXTENDED RELEASE ORAL at 17:07

## 2018-05-16 RX ADMIN — Medication 10 ML: at 17:08

## 2018-05-16 RX ADMIN — POTASSIUM CHLORIDE 20 MEQ: 20 TABLET, EXTENDED RELEASE ORAL at 08:59

## 2018-05-16 RX ADMIN — CEFTRIAXONE SODIUM 2 G: 2 INJECTION, POWDER, FOR SOLUTION INTRAMUSCULAR; INTRAVENOUS at 17:07

## 2018-05-16 RX ADMIN — METOPROLOL TARTRATE 25 MG: 25 TABLET ORAL at 08:59

## 2018-05-16 RX ADMIN — APIXABAN 2.5 MG: 2.5 TABLET, FILM COATED ORAL at 08:59

## 2018-05-16 RX ADMIN — Medication 5 ML: at 05:30

## 2018-05-16 RX ADMIN — METOPROLOL TARTRATE 25 MG: 25 TABLET ORAL at 17:07

## 2018-05-16 RX ADMIN — LORATADINE 10 MG: 10 TABLET ORAL at 08:59

## 2018-05-16 RX ADMIN — DRONEDARONE 400 MG: 400 TABLET, FILM COATED ORAL at 08:59

## 2018-05-16 RX ADMIN — DRONEDARONE 400 MG: 400 TABLET, FILM COATED ORAL at 17:07

## 2018-05-16 RX ADMIN — APIXABAN 2.5 MG: 2.5 TABLET, FILM COATED ORAL at 17:07

## 2018-05-16 RX ADMIN — Medication 5 ML: at 21:56

## 2018-05-16 RX ADMIN — AMLODIPINE BESYLATE 5 MG: 5 TABLET ORAL at 08:59

## 2018-05-16 NOTE — PROGRESS NOTES
Hospitalist Progress Note     Admit Date:  2018 11:34 AM   Name:  Zehra Rogers   Age:  80 y.o.  :  1931   MRN:  [id number]   PCP:  Marsha Carson MD  Treatment Team: Attending Provider: Manuel Merrill MD; Utilization Review: Esthela Pedroza RN; Consulting Provider: Eleni Jurado MD; Consulting Provider: Selam Bueno MD; Care Manager: Kalli Iverson. Jeana Husain; Utilization Review: Betty Medrano    Subjective: This is an 81 YO female patient with a PMH of afib on Eliquis and dronedarone, dementia, previous DVT and PE admitted to the hospital due to SOB and fatigue and poor appetite,  Blood culture reported GBS bacteriemia. Switched to IV ceftriaxone on . ECHO ordered and showed NO evidence of vegetations. ID consulted, source of infection NOT clear. Patient has R knee pain. Arthrocentesis recommended. Ortho consulted. Has bad sundown with severe agitation at night.     5/15/18  Pt awake,alert,says doing ok    18  Pt sitting in chair- says doing ok  Had ct abd/pelvis on  5/15/18- no acute etiology    Objective:     Patient Vitals for the past 24 hrs:   Temp Pulse Resp BP SpO2   18 1133 97.7 °F (36.5 °C) 60 20 126/74 93 %   18 0738 98.3 °F (36.8 °C) 78 21 136/83 95 %   18 0623 98.8 °F (37.1 °C) 75 20 160/82 97 %   05/15/18 2207 98.7 °F (37.1 °C) 81 20 150/65 98 %   05/15/18 1921 97.5 °F (36.4 °C) 80 20 107/64 95 %   05/15/18 1533 98.1 °F (36.7 °C) 67 20 127/67 97 %     Oxygen Therapy  O2 Sat (%): 93 % (18 1133)  Pulse via Oximetry: 106 beats per minute (18 2131)  O2 Device: Room air (18 1140)    Intake/Output Summary (Last 24 hours) at 18 1239  Last data filed at 18 0932   Gross per 24 hour   Intake             2289 ml   Output                0 ml   Net             2289 ml         General:    Well nourished. Alert.    heent- normal  CV:   RRR. No murmur, rub, or gallop.   Lungs:   Mild crep basal, no wheezing  Abdomen: Soft, nontender, nondistended. Cns- no focal neurological deficits  Extremities: Warm and dry. No cyanosis or edema. Skin:     No rashes or jaundice. Data Review:  I have reviewed all labs, meds, telemetry events, and studies from the last 24 hours. Recent Results (from the past 24 hour(s))   GLUCOSE, POC    Collection Time: 05/15/18  3:31 PM   Result Value Ref Range    Glucose (POC) 146 (H) 65 - 100 mg/dL   GLUCOSE, POC    Collection Time: 05/16/18 11:37 AM   Result Value Ref Range    Glucose (POC) 117 (H) 65 - 100 mg/dL        All Micro Results     Procedure Component Value Units Date/Time    CULTURE, BODY FLUID Charolotte Rather STAIN [[id number]] Collected:  05/14/18 1240    Order Status:  Completed Specimen:  Joint Fluid Updated:  05/15/18 0733     Special Requests: NO SPECIAL REQUESTS        GRAM STAIN NO WBCS SEEN         NO DEFINITE ORGANISM SEEN        Culture result: NO GROWTH 1 DAY       CULTURE, BLOOD [[id number]] Collected:  05/13/18 1125    Order Status:  Completed Specimen:  Blood from Blood Updated:  05/15/18 0731     Special Requests: --        LEFT  Antecubital       Culture result: NO GROWTH 2 DAYS       CULTURE, BLOOD [[id number]] Collected:  05/13/18 1131    Order Status:  Completed Specimen:  Blood from Blood Updated:  05/15/18 0731     Special Requests: --        RIGHT  HAND       Culture result: NO GROWTH 2 DAYS       Arredondo Kettle STAIN [[id number]] Collected:  05/14/18 1240    Order Status:  Canceled Specimen:  Joint Fluid     CULTURE, BLOOD [[id number]]  (Abnormal) Collected:  05/11/18 1259    Order Status:  Completed Specimen:  Blood from Blood Updated:  05/13/18 0622     Special Requests: --        RIGHT  Antecubital       GRAM STAIN GRAM POSITIVE COCCI                 AEROBIC AND ANAEROBIC BOTTLES              CRITICAL RESULT NOT CALLED DUE TO PREVIOUS NOTIFICATION OF CRITICAL RESULT WITHIN THE LAST 24 HOURS.      Culture result:         STREPTOCOCCUS AGALACTIAE SERO GROUP B (A)            For Susceptibility Refer to Culture  ACCESSION [id number]      CULTURE, BLOOD [[id number]]  (Abnormal)  (Susceptibility) Collected:  05/11/18 1259    Order Status:  Completed Specimen:  Blood from Blood Updated:  05/13/18 0622     Special Requests: --        LEFT  Antecubital       GRAM STAIN GRAM POSITIVE COCCI                 AEROBIC AND ANAEROBIC BOTTLES              RESULTS VERIFIED, PHONED TO AND READ BACK BY Stanislav Meraz RN ON 05/11/2018 AT 2242 WMR     Culture result:         STREPTOCOCCUS AGALACTIAE SERO GROUP B (A)          Current Meds:  Current Facility-Administered Medications   Medication Dose Route Frequency    0.9% sodium chloride infusion  1 mL/kg/hr IntraVENous PRN    potassium chloride (K-DUR, KLOR-CON) SR tablet 20 mEq  20 mEq Oral BID    LORazepam (ATIVAN) injection 1 mg  1 mg IntraVENous Q6H PRN    haloperidol lactate (HALDOL) injection 2 mg  2 mg IntraVENous QHS PRN    0.45% sodium chloride infusion  50 mL/hr IntraVENous CONTINUOUS    cefTRIAXone (ROCEPHIN) 2 g in 0.9% sodium chloride (MBP/ADV) 50 mL  2 g IntraVENous Q24H    amLODIPine (NORVASC) tablet 5 mg  5 mg Oral DAILY    apixaban (ELIQUIS) tablet 2.5 mg  2.5 mg Oral BID    donepezil (ARICEPT) tablet 5 mg  5 mg Oral QHS    dronedarone (MULTAQ) tablet tab 400 mg  400 mg Oral BID WITH MEALS    loratadine (CLARITIN) tablet 10 mg  10 mg Oral DAILY    metoprolol tartrate (LOPRESSOR) tablet 25 mg  25 mg Oral BID    sodium chloride (NS) flush 5-10 mL  5-10 mL IntraVENous Q8H    sodium chloride (NS) flush 5-10 mL  5-10 mL IntraVENous PRN    acetaminophen (TYLENOL) tablet 650 mg  650 mg Oral Q4H PRN    ondansetron (ZOFRAN) injection 4 mg  4 mg IntraVENous Q4H PRN    magnesium hydroxide (MILK OF MAGNESIA) 400 mg/5 mL oral suspension 30 mL  30 mL Oral DAILY PRN       Other Studies (last 24 hours):  Ct Abd Pelv W Cont    Result Date: 5/15/2018  CT of the Abdomen and Pelvis with contrast CLINICAL INDICATION:  Acute fever and leukocytosis, group B streptococcus bacteremia, lower extremity pain and swelling, evaluate for septic arthritis; history also of DVT, hypertension, PE, atrial fibrillation, chronic kidney disease, kidney cancer and left nephrectomy, abdominal aortic aneurysm, partial hysterectomy COMPARISON: 8/7/2017, 7/30/2012 TECHNIQUE: Automated exposure Control was used. Multiple axial images were obtained through the abdomen and pelvis after intravenous injection of 125cc of isovue 370. Oral contrast given for evaluation of GI tract. Coronal reformatted images obtained for further evaluation of organs. FINDINGS: The partially seen lung bases demonstrated a mild posterior right lower lobe infiltrate and scattered bilateral mild atelectasis. The partially visualized heart is enlarged. Abdomen: There are no suspicious lesions in the liver or spleen. The adrenal glands demonstrate chronic nonspecific thickening, unchanged. Pancreas and gallbladder are unremarkable. Left nephrectomy again noted without obvious complication. Small hypodensities in the renal cortices are too small to characterize, statistically benign. There is normal enhancement of the right kidney, no hydronephrosis. No lymphadenopathy, free air, focal inflammatory changes or fluid collections in the abdomen. Aorta again appears tortuous with diffuse atherosclerotic change and a distal 2.8 cm aneurysm similar to prior. There is no evidence of bowel obstruction. Moderately prominent stool in the large bowel. Appendix not definitely seen. Stomach moderately distended with debris. Surgical staples are noted in the anterior abdominal wall. Pelvis:  No acute inflammatory changes or fluid collections in the pelvis. Uterus is absent. Left ovary is unchanged. No developing lymphadenopathy or mass. No acute osseous lesions are seen. IMPRESSION: 1. No acute abnormality in the abdomen or pelvis. 2. Mild right lower lung infiltrate.  3. Chronic changes including left nephrectomy, atherosclerotic vascular disease, partial hysterectomy, abdominal aortic aneurysm, constipation. Assessment and Plan:     Hospital Problems as of 5/16/2018  Date Reviewed: 7/8/2016          Codes Class Noted - Resolved POA    * (Principal)Bacteremia due to Streptococcus ICD-10-CM: R78.81, B95.5  ICD-9-CM: 790.7, 041.00  5/12/2018 - Present Unknown        Bacteremia ICD-10-CM: R78.81  ICD-9-CM: 790.7  5/12/2018 - Present Unknown        Weakness ICD-10-CM: R53.1  ICD-9-CM: 780.79  5/11/2018 - Present Yes        Leukocytosis ICD-10-CM: D72.829  ICD-9-CM: 288.60  5/11/2018 - Present Yes        HLD (hyperlipidemia) ICD-10-CM: E78.5  ICD-9-CM: 272.4  4/21/2016 - Present Yes        Atrial fibrillation (HCC) ICD-10-CM: I48.91  ICD-9-CM: 427.31  4/21/2016 - Present Yes        CKD (chronic kidney disease) stage 5, GFR less than 15 ml/min (HCC) ICD-10-CM: N18.5  ICD-9-CM: 585.5  7/9/2015 - Present Yes    Overview Addendum 4/21/2016  8:04 PM by Bridgette Andrews MD     Followed by Dr. Jackie Saravia. Prior left nephrectomy. Pulmonary hypertension (HCC) (Chronic) ICD-10-CM: I27.20  ICD-9-CM: 416.8  8/4/2014 - Present Yes        DVT (deep venous thrombosis) (HCC) (Chronic) ICD-10-CM: I82.409  ICD-9-CM: 453.40  8/1/2014 - Present Yes    Overview Addendum 4/27/2017  2:00 PM by Bridgette Andrews MD     1. LE Ultrasound (7/31/14) : Occlusive and nonocclusive thrombus within the deep venous system of the left lower extremity. Pulmonary embolism (HCC) (Chronic) ICD-10-CM: I26.99  ICD-9-CM: 415.19  7/30/2014 - Present Yes    Overview Signed 8/1/2014  7:36 AM by Karen Guzman NP     7/30/14 VQ scan:  1. Abnormal perfusion defect involving the entire right upper lobe. The right   upper lobe contains 3 segments and therefore this would be consistent with 3   large perfusion defects. Therefore, this would be considered a high probability   for pulmonary embolism study.              HTN (hypertension) (Chronic) ICD-10-CM: I10  ICD-9-CM: 401.9  7/30/2014 - Present Yes    Overview Signed 4/27/2017  2:01 PM by Hugo Palomino MD     Echo (1/22/15):  EF 50-55%. AVSC. Mild/moderate TR.  RVSP 50                   PLAN:    -STREPTOCOCCUS AGALACTIAE SERO GROUP B (A)- bacteremia- on rocephin-- echo no vegetations-  - acute on ckd 3--labs pending  -rt knee aspirate- pending culture  - ams- resolved  -Hx cerviocal/renal cancer, s/p chemo/XRT, left nephrectomy 2002  - afib on eliquis  - htn  - hypernatremia- resolved.     DC planning/Dispo:    DVT ppx:  eliquis    Signed:  Jose Giron MD

## 2018-05-16 NOTE — PROGRESS NOTES
Infectious Disease Progress Note    Today's Date: 2018   Admit Date: 2018    Impression:   · Group B strep bacteremia (), source unknown -  BCX negative;  TTE: mild MR, mod TR; no veg  · R knee pain - s/p R knee aspirate, , 319 WBCs; Cx: NGTD  · Fever/leukocytosis: resolved  · AMS/delirium - improved  · Hx cervical/renal cancer, s/p chemo/XRT, left nephrectomy   · CKD 3, CrCl ~30  · PCN allergy: rash/skin peeling    Plan:   · Continue ceftriaxone. Plan for 2 weeks from negative [address] with EOT 18. · Place PICC. · Since repeat BCX are negative, will not plan for SILVIO. Anti-infectives:   Ceftriaxone -  Vanc -    Subjective:   Date of Consultation:  May 16, 2018  Referring Physician: Dr Magalie Eng    Doing well; afebrile; getting bathed; Allergies   Allergen Reactions    Lipitor [Atorvastatin] Unable to Obtain    Pcn [Penicillins] Rash     With peeling skin        Review of Systems:  Pertinent items are noted in the History of Present Illness. Objective:     Visit Vitals    /83 (BP 1 Location: Left arm, BP Patient Position: At rest)    Pulse 78    Temp 98.3 °F (36.8 °C)    Resp 21    Ht 5' 2\" (1.575 m)    Wt 71.6 kg (157 lb 14.4 oz)    SpO2 95%    BMI 28.88 kg/m2     Temp (24hrs), Av.2 °F (36.8 °C), Min:97.5 °F (36.4 °C), Max:98.8 °F (37.1 °C)       Lines:  Peripheral IV:  LEft arm intact          Physical Exam:    General:  Alert, cooperative, sitting in bed, NAD;   Eyes:  Sclera anicteric.    Mouth/Throat: oral pharynx clear   Neck:    Lungs:   Breathing comfortably   CV:     Abdomen:   non-distended   Extremities: No cyanosis, R knee benign today   Skin: no acute rash or lesions   Lymph nodes:    Musculoskeletal: No swelling or deformity   Lines/Devices:  Intact, no erythema, drainage or tenderness   Psych: Alert and responsive;       Data Review:     CBC:  Recent Labs      05/15/18   0629  18   0632   WBC  5.0  4.7   HGB  13.4  11.5* HCT  41.2  36.7   PLT  244  177       BMP:  Recent Labs      05/15/18   0629  05/14/18   0632   CREA  1.15*  1.18*   BUN  13  15   NA  144  148*   K  3.5  3.4*   CL  108*  113*   CO2  24  25   AGAP  12  10   GLU  113*  89       LFTS:  No results for input(s): TBILI, ALT, SGOT, AP, TP, ALB in the last 72 hours. Microbiology:     All Micro Results     Procedure Component Value Units Date/Time    CULTURE, BLOOD [[id number]] Collected:  05/13/18 1125    Order Status:  Completed Specimen:  Blood from Blood Updated:  05/16/18 0724     Special Requests: --        LEFT  Antecubital       Culture result: NO GROWTH 3 DAYS       CULTURE, BLOOD [[id number]] Collected:  05/13/18 1131    Order Status:  Completed Specimen:  Blood from Blood Updated:  05/16/18 0724     Special Requests: --        RIGHT  HAND       Culture result: NO GROWTH 3 DAYS       CULTURE, BODY FLUID W Nicklas Clap [[id number]] Collected:  05/14/18 1240    Order Status:  Completed Specimen:  Joint Fluid Updated:  05/15/18 0733     Special Requests: NO SPECIAL REQUESTS        GRAM STAIN NO WBCS SEEN         NO DEFINITE ORGANISM SEEN        Culture result: NO GROWTH 1 DAY       Nicklas Clap [[id number]] Collected:  05/14/18 1240    Order Status:  Canceled Specimen:  Joint Fluid     CULTURE, BLOOD [[id number]]  (Abnormal) Collected:  05/11/18 1259    Order Status:  Completed Specimen:  Blood from Blood Updated:  05/13/18 0622     Special Requests: --        RIGHT  Antecubital       GRAM STAIN GRAM POSITIVE COCCI                 AEROBIC AND ANAEROBIC BOTTLES              CRITICAL RESULT NOT CALLED DUE TO PREVIOUS NOTIFICATION OF CRITICAL RESULT WITHIN THE LAST 24 HOURS.      Culture result:         STREPTOCOCCUS AGALACTIAE SERO GROUP B (A)            For Susceptibility Refer to Culture  ACCESSION [id number]      CULTURE, BLOOD [[id number]]  (Abnormal)  (Susceptibility) Collected:  05/11/18 1259    Order Status:  Completed Specimen:  Blood from Blood Updated:  05/13/18 4506     Special Requests: --        LEFT  Antecubital       GRAM STAIN GRAM POSITIVE COCCI                 AEROBIC AND ANAEROBIC BOTTLES              RESULTS VERIFIED, PHONED TO AND READ BACK BY Wang Alvarado RN ON 2018 AT 2242 WMR     Culture result:         STREPTOCOCCUS AGALACTIAE SERO GROUP B (A)          Imagin/15/18 CT abd/pelvis: IMPRESSION:   1. No acute abnormality in the abdomen or pelvis. 2. Mild right lower lung infiltrate. 3. Chronic changes including left nephrectomy, atherosclerotic vascular disease,  partial hysterectomy, abdominal aortic aneurysm, constipation.     Signed By: Darian Farooq MD     May 16, 2018

## 2018-05-16 NOTE — PROGRESS NOTES
Alert  Up in chair most of afternoon  Tolerating well  Has been to bedside commode with assist  Confused  Cooperative and calm  Room air  Denies pain  Appetite good  Feeds self most of meal then assisted with staff

## 2018-05-16 NOTE — PROGRESS NOTES
Problem: Mobility Impaired (Adult and Pediatric)  Goal: *Acute Goals and Plan of Care (Insert Text)  STG:  (1.)Ms. Jeremiah Cardoza will move from supine to sit and sit to supine , scoot up and down and roll side to side with MINIMAL ASSIST within 3 treatment day(s). (2.)Ms. Jeremiah Cardoza will transfer from bed to chair and chair to bed with MINIMAL ASSIST using the least restrictive device within 3 treatment day(s). MET 5/14/18  (3.)Ms. Jeremiah Cardoza will ambulate with MINIMAL ASSIST for 40 feet with the least restrictive device within 3 treatment day(s). LTG:  (1.)Ms. Jeremiah Cardoza will move from supine to sit and sit to supine , scoot up and down and roll side to side in bed with STAND BY ASSIST within 7 treatment day(s). (2.)Ms. Jeremiah Cardoza will transfer from bed to chair and chair to bed with STAND BY ASSIST using the least restrictive device within 7 treatment day(s). (3.)Ms. Jeremiah Cardoza will ambulate with STAND BY ASSIST for 75 feet with the least restrictive device within 7 treatment day(s). ________________________________________________________________________________________________      PHYSICAL THERAPY: Daily Note, Treatment Day: 2nd, AM 5/16/2018  INPATIENT: Hospital Day: 6  Payor: [employer]US / [place] [employer] / Product Type: Managed Care Medicare /      NAME/AGE/GENDER: Chanell Ernst is a 80 y.o. female   PRIMARY DIAGNOSIS: Weakness  Bacteremia Bacteremia due to Streptococcus Bacteremia due to Streptococcus        ICD-10: Treatment Diagnosis:    · Generalized Muscle Weakness (M62.81)  · Difficulty in walking, Not elsewhere classified (R26.2)  · Other abnormalities of gait and mobility (R26.89)   Precaution/Allergies:  Lipitor [atorvastatin] and Pcn [penicillins]      ASSESSMENT:     Ms. Jeremiah Cardoza was supine upon contact and agreeable to PT. Patient able to perform supine to sit with mod assist, additional time, and cues for improved/proper technique.  Patient transfers to standing with min assist and cues for hand placement. Once standing patient able to increase gait distance to 25' with use of rolling walker, CGA-min assist for walker manipulation, and cues for sequencing with rolling walker as well as posture. Patient ambulates a slow, shuffled gait pattern requiring cues for improved step length and gait speed. Patient then participatesin therapeutic strengthening exercises to improve functional strength for transfers, gait and overall mobility. Patient requires cues and assistance to perform exercises correctly. BLE's weak. Overall slow progress towards physical therapy goals. No goals have been met thus far. Will continue efforts. This section established at most recent assessment   PROBLEM LIST (Impairments causing functional limitations):  1. Decreased Strength  2. Decreased ADL/Functional Activities  3. Decreased Transfer Abilities  4. Decreased Ambulation Ability/Technique  5. Decreased Balance  6. Decreased Activity Tolerance  7. Decreased Cognition   INTERVENTIONS PLANNED: (Benefits and precautions of physical therapy have been discussed with the patient.)  1. Balance Exercise  2. Bed Mobility  3. Family Education  4. Gait Training  5. Therapeutic Activites  6. Therapeutic Exercise/Strengthening  7. Transfer Training  8. Group Therapy     TREATMENT PLAN: Frequency/Duration: 3 times a week for duration of hospital stay  Rehabilitation Potential For Stated Goals: Good     RECOMMENDED REHABILITATION/EQUIPMENT: (at time of discharge pending progress): Due to the probability of continued deficits (see above) this patient will likely need continued skilled physical therapy after discharge.   Equipment:    Walkers, Type: Rolling Walker              HISTORY:   History of Present Injury/Illness (Reason for Referral):  Per H&P: \"  HISTORY OF PRESENT ILLNESS:   Alessandro Farrell is an 80 y.o. female with a past medical history of atrial fibrillation, HTN, and hx of DVT/PE who presents to the ER with her daughter with complaint of weakness and shortness of breath over the past 2-3 days. The patient is unable to give much more information, mostly mumbling \"yes\" or \"no. \" She denies any pain except in her knees. The patient lives alone, but her daughter lives in [place] and checks in on her frequently. Earlier today, the patient slid down into her floor and could not get up, prompting an EMS call. She still feels very weak and requires assistance sitting up in bed. Denies any fevers, chills, nausea, vomiting, diarrhea, constipation, abdominal pain, chest pain. Denies cough.     REVIEW OF SYSTEMS: Comprehensive ROS performed and negative except as stated in HPI. \"  Past Medical History/Comorbidities:   Ms. Isabel Haile  has a past medical history of Anemia (4/21/2016); Anemia due to blood loss, acute (2014); Arthritis; Atrial fibrillation ([facility] [address] (4/21/2016); Cancer [facility]) (Jan 2002); DVT (deep venous thrombosis) ([facility] [address] (7/2014); Dyspnea on exertion; Edema; Environmental allergies; GERD (gastroesophageal reflux disease); HLD (hyperlipidemia) (4/21/2016); Hyperlipidemia; Hypertension; Hypokalemia (2014); Incisional hernia; Low serum sodium (2014); Melena (2014); PE (pulmonary embolism) (7/2014); Pulmonary HTN ([facility] [address] Tricuspid regurgitation; and Vitamin D deficiency. Ms. Isabel Haile  has a past surgical history that includes hx bipin and bso (1971); hx urological (2002); and hx hernia repair (6/15/12). Social History/Living Environment:   Home Environment: Private residence  # Steps to Enter: 2  Rails to Enter: Yes  Hand Rails : Right  One/Two Story Residence: One story  Living Alone: Yes  Support Systems: Child(claudia), Home care staff  Patient Expects to be Discharged to[de-identified] Unknown  Current DME Used/Available at Home: Cane, quad, [employer], straight, Shower chair  Tub or Shower Type: Tub/Shower combination  Prior Level of Function/Work/Activity:  Lives alone with home care staff in 8 hr/day weekdays.   Gets some assistance for bathing and ambulates with a cane. Number of Personal Factors/Comorbidities that affect the Plan of Care:  Lives alone 1-2: MODERATE COMPLEXITY   EXAMINATION:   Most Recent Physical Functioning:   Gross Assessment:                  Posture:     Balance:  Sitting: Impaired  Sitting - Static: Good (unsupported)  Sitting - Dynamic: Fair (occasional)  Standing: Impaired; With support  Standing - Static: Fair;Constant support  Standing - Dynamic : Fair Bed Mobility:  Supine to Sit: Moderate assistance  Wheelchair Mobility:     Transfers:  Sit to Stand: Minimum assistance  Stand to Sit: Minimum assistance  Gait:     Base of Support: Widened  Speed/Alena: Slow;Shuffled  Step Length: Left shortened;Right shortened  Gait Abnormalities: Decreased step clearance; Path deviations; Shuffling gait  Distance (ft): 25 Feet (ft)  Assistive Device: Walker, rolling  Ambulation - Level of Assistance: Contact guard assistance  Interventions: Safety awareness training;Verbal cues      Body Structures Involved:  1. Metabolic  2. Endocrine  3. Muscles Body Functions Affected:  1. Mental  2. Neuromusculoskeletal  3. Movement Related  4. Metobolic/Endocrine Activities and Participation Affected:  1. General Tasks and Demands  2. Mobility  3. Self Care  4. Domestic Life  5. Community, Social and [place]   Number of elements that affect the Plan of Care: 4+: HIGH COMPLEXITY   CLINICAL PRESENTATION:   Presentation: Stable and uncomplicated: LOW COMPLEXITY   CLINICAL DECISION MAKIN [facility] Inpatient Short Form  How much difficulty does the patient currently have. .. Unable A Lot A Little None   1. Turning over in bed (including adjusting bedclothes, sheets and blankets)? [] 1   [] 2   [] 3   [x] 4   2. Sitting down on and standing up from a chair with arms ( e.g., wheelchair, bedside commode, etc.)   [] 1   [x] 2   [] 3   [] 4   3. Moving from lying on back to sitting on the side of the bed? [] 1   [x] 2   [] 3   [] 4   How much help from another person does the patient currently need. .. Total A Lot A Little None   4. Moving to and from a bed to a chair (including a wheelchair)? [] 1   [x] 2   [] 3   [] 4   5. Need to walk in hospital room? [] 1   [x] 2   [] 3   [] 4   6. Climbing 3-5 steps with a railing? [x] 1   [] 2   [] 3   [] 4   © 2007, Trustees of [address] [place] [address] [address] [address], under license to [employer]. All rights reserved      Score:  Initial: 13 Most Recent: X (Date: -- )    Interpretation of Tool:  Represents activities that are increasingly more difficult (i.e. Bed mobility, Transfers, Gait). Score 24 23 22-20 19-15 14-10 9-7 6     Modifier CH CI CJ CK CL CM CN      ? Mobility - Walking and Moving Around:     - CURRENT STATUS: CL - 60%-79% impaired, limited or restricted    - GOAL STATUS: CK - 40%-59% impaired, limited or restricted    - D/C STATUS:  ---------------To be determined---------------  Payor: [employer] PLUS / Plan: [facility] [employer] PLUS / Product Type: Managed Care Medicare /      Medical Necessity:     · Patient demonstrates good rehab potential due to higher previous functional level. Reason for Services/Other Comments:  · Patient continues to require skilled intervention due to decreased balance and functional mobility. Use of outcome tool(s) and clinical judgement create a POC that gives a: Clear prediction of patient's progress: LOW COMPLEXITY            TREATMENT:   (In addition to Assessment/Re-Assessment sessions the following treatments were rendered)   Pre-treatment Symptoms/Complaints:  No complaints of pain  Pain: Initial:   Pain Intensity 1: 0  Post Session:  No complaints     Therapeutic Activity: (    13 minutes):   Therapeutic activities including bed mobility training, transfer training, ambulation on level ground, posture training, instruction in sequencing with rolling walker, scooting, and patient education to improve mobility, strength, balance and activity tolerance. Required minimal Safety awareness training;Verbal cues to promote static and dynamic balance in standing and promote coordination of bilateral, lower extremity(s)     Therapeutic Exercise: (  10 Minutes):  Exercises per grid below to improve mobility, strength and balance. Required moderate visual, verbal, manual and tactile cues to promote proper body alignment, promote proper body posture and promote proper body mechanics. Progressed range, repetitions and complexity of movement as indicated. Date:  5/16/18 Date:   Date:     ACTIVITY/EXERCISE AM PM AM PM AM PM   Ambulation:           Distance  Device  Duration         Seated Heel Raises x15B A        Seated Toe Raises x15B A        Seated Long Arc Quads x15B A        Seated Marching x15B A  Cues to perform properly        Seated Hip Abduction x15B A                 B = bilateral; AA = active assistive; A = active; P = passive          Braces/Orthotics/Lines/Etc:   · IV  · O2 Device: Room air  Treatment/Session Assessment:    · Response to Treatment:  See above  · Interdisciplinary Collaboration:   o Physical Therapy Assistant  o Registered Nurse  · After treatment position/precautions:   o Up in chair  o Bed alarm/tab alert on  o Bed/Chair-wheels locked  o Call light within reach  o RN notified   · Compliance with Program/Exercises: Will assess as treatment progresses. · Recommendations/Intent for next treatment session: \"Next visit will focus on advancements to more challenging activities and reduction in assistance provided\".   Total Treatment Duration:  PT Patient Time In/Time Out  Time In: 0930  Time Out: [address] PTA

## 2018-05-16 NOTE — PROGRESS NOTES
Pt is quietly resting in bed. Respirations are even and unlabored. Pt has no c/o pain or needs expressed at this time. Pt is stable. Safety measures in place.

## 2018-05-16 NOTE — PROGRESS NOTES
Problem: Self Care Deficits Care Plan (Adult)  Goal: *Acute Goals and Plan of Care (Insert Text)  1. Patient will complete upper body bathing and dressing after set up while in unsupported sitting at EOB. MODIFIED  2. Patient will complete toileting and toileting transfers with SBA. MODIFIED  3. Patient will tolerate 25 minutes of OT treatment with 3-4 rest breaks to increase activity tolerance for ADLs. CONTINUE  4. Patient will complete functional transfers with SBA and adaptive equipment as needed. MODIFIED     Timeframe: 7 visits       OCCUPATIONAL THERAPY: Daily Note, Treatment Day: 1st and AM 5/16/2018  INPATIENT: Hospital Day: 6  Payor: [employer] / [place] [employer] / Product Type: Managed Care Medicare /      NAME/AGE/GENDER: Magaly Woods is a 80 y.o. female   PRIMARY DIAGNOSIS:  Weakness  Bacteremia Bacteremia due to Streptococcus Bacteremia due to Streptococcus        ICD-10: Treatment Diagnosis:    · Pain in Left Knee (M25.562)  · Generalized Muscle Weakness (M62.81)  · Other lack of cordination (R27.8)  · History of falling (Z91.81)   Precautions/Allergies:     Lipitor [atorvastatin] and Pcn [penicillins]      ASSESSMENT:   Ms. Sumeet Barnett was admitted for the above diagnosis. Pt was brought to therapy gym to participate in group exercises (in grid below) to increase UE strength and activity tolerance to perform mobility and ADLs. Pt required visual, verbal, and tactile cueing to complete exercises. Pt tolerated session well. Returned to room by recliner. Pt making minimal progress towards goals above. Will continue to benefit from skilled OT during stay. This section established at most recent assessment   PROBLEM LIST (Impairments causing functional limitations):  1. Decreased Strength  2. Decreased ADL/Functional Activities  3. Decreased Transfer Abilities  4. Decreased Ambulation Ability/Technique  5. Decreased Balance  6. Increased Pain  7.  Decreased Activity Tolerance  8. Increased Fatigue  9. Decreased Flexibility/Joint Mobility  10. Decreased [place] with Home Exercise Program  11. Decreased Cognition   INTERVENTIONS PLANNED: (Benefits and precautions of occupational therapy have been discussed with the patient.)  1. Activities of daily living training  2. Adaptive equipment training  3. Balance training  4. Clothing management  5. Cognitive training  6. Donning&doffing training  7. Group therapy  8. Neuromuscular re-eduation  9. Therapeutic activity  10. Therapeutic exercise     TREATMENT PLAN: Frequency/Duration: Follow patient 3 times per week to address above goals. Rehabilitation Potential For Stated Goals: Good     RECOMMENDED REHABILITATION/EQUIPMENT: (at time of discharge pending progress): Due to the probability of continued deficits (see above) this patient will likely need continued skilled occupational therapy after discharge. Equipment:    TBD              OCCUPATIONAL PROFILE AND HISTORY:   History of Present Injury/Illness (Reason for Referral):  See H&P  Past Medical History/Comorbidities:   Ms. Antonette Cunningham  has a past medical history of Anemia (4/21/2016); Anemia due to blood loss, acute (2014); Arthritis; Atrial fibrillation ([facility] [address] (4/21/2016); Cancer [facility]) (Jan 2002); DVT (deep venous thrombosis) ([facility] [address] (7/2014); Dyspnea on exertion; Edema; Environmental allergies; GERD (gastroesophageal reflux disease); HLD (hyperlipidemia) (4/21/2016); Hyperlipidemia; Hypertension; Hypokalemia (2014); Incisional hernia; Low serum sodium (2014); Melena (2014); PE (pulmonary embolism) (7/2014); Pulmonary HTN ([facility] [address] Tricuspid regurgitation; and Vitamin D deficiency. Ms. Antonette Cunningham  has a past surgical history that includes hx bipin and bso (1971); hx urological (2002); and hx hernia repair (6/15/12).   Social History/Living Environment:   Home Environment: Private residence  # Steps to Enter: 2  Rails to Enter: Yes  Hand Rails : Right  One/Two Story Residence: One story  Living Alone: Yes  Support Systems: Child(claudia), Home care staff  Patient Expects to be Discharged to[de-identified] Unknown  Current DME Used/Available at Home: Cane, quad, Matt Cramp, straight, Shower chair  Tub or Shower Type: Tub/Shower combination  Prior Level of Function/Work/Activity:  Pt lives at home alone with caregivers for 4 hours in the morning and 2 hours in the evening M-F. Sat and Sunday pt has caregiver for 2 hours in the evening. Pt was using a cane for functional mobility. Pt was still able to get to the bathroom on her own. Caregivers were assisting her with ADL. Pt 's daughter checks in on her as well. Personal Factors:          Age:  80 y.o. Social Background:  Lives alone. Past/Current Experience:  Hx of lowering/falling to the floor        Other factors that influence how disability is experienced by the patient:  Multiple co-morbidities    Number of Personal Factors/Comorbidities that affect the Plan of Care: Extensive review of physical, cognitive, and psychosocial performance (3+):  HIGH COMPLEXITY   ASSESSMENT OF OCCUPATIONAL PERFORMANCE[de-identified]   Activities of Daily Living:           Basic ADLs (From Assessment) Complex ADLs (From Assessment)   Feeding: Minimum assistance  Oral Facial Hygiene/Grooming: Moderate assistance  Bathing: Maximum assistance  Upper Body Dressing: Moderate assistance  Lower Body Dressing: Total assistance  Toileting: Total assistance Instrumental ADL  Meal Preparation: Total assistance  Homemaking:  Total assistance   Grooming/Bathing/Dressing Activities of Daily Living     Cognitive Retraining  Safety/Judgement: Awareness of environment                       Bed/Mat Mobility  Supine to Sit: Moderate assistance  Sit to Stand: Minimum assistance       Most Recent Physical Functioning:   Gross Assessment:                  Posture:  Posture (WDL): Exceptions to WDL  Posture Assessment: Rounded shoulders  Balance:  Sitting: Impaired  Sitting - Static: Good (unsupported)  Sitting - Dynamic: Fair (occasional)  Standing: Impaired; With support  Standing - Static: Fair;Constant support  Standing - Dynamic : Fair Bed Mobility:  Supine to Sit: Moderate assistance  Wheelchair Mobility:     Transfers:  Sit to Stand: Minimum assistance  Stand to Sit: Minimum assistance              Patient Vitals for the past 6 hrs:   BP BP Patient Position SpO2 Pulse   18 0623 160/82 At rest 97 % 75   18 0738 136/83 At rest 95 % 78       Mental Status  Neurologic State: Alert  Orientation Level: Oriented to person, Oriented to place  Cognition: Appropriate decision making, Follows commands  Perception: Appears intact  Perseveration: No perseveration noted  Safety/Judgement: Awareness of environment                          Physical Skills Involved:  1. Range of Motion  2. Balance  3. Strength  4. Activity Tolerance  5. Gross Motor Control  6. Pain (acute)  7. Pain (Chronic)  8. Skin Integrity Cognitive Skills Affected (resulting in the inability to perform in a timely and safe manner):  1. Executive Function  2. Short Term Recall  3. Divided Attention Psychosocial Skills Affected:  1. Habits/Routines  2. Environmental Adaptation  3. Self-Awareness   Number of elements that affect the Plan of Care: 5+:  HIGH COMPLEXITY   CLINICAL DECISION MAKIN [facility] [address] [id number] AM-PAC 6 Clicks   Daily Activity Inpatient Short Form  How much help from another person does the patient currently need. .. Total A Lot A Little None   1. Putting on and taking off regular lower body clothing? [] 1   [x] 2   [] 3   [] 4   2. Bathing (including washing, rinsing, drying)? [] 1   [x] 2   [] 3   [] 4   3. Toileting, which includes using toilet, bedpan or urinal?   [] 1   [x] 2   [] 3   [] 4   4. Putting on and taking off regular upper body clothing? [] 1   [] 2   [x] 3   [] 4   5. Taking care of personal grooming such as brushing teeth? [] 1   [] 2   [x] 3   [] 4   6. Eating meals?    [] 1 [] 2   [] 3   [x] 4   © 2007, Trustees of [address] [place] [address] [address] [id number], under license to [employer]. All rights reserved      Score:  Initial: 11 Most Recent: 16(Date: -- )    Interpretation of Tool:  Represents activities that are increasingly more difficult (i.e. Bed mobility, Transfers, Gait). Score 24 23 22-20 19-15 14-10 9-7 6     Modifier CH CI CJ CK CL CM CN      ? Self Care:     - CURRENT STATUS: CK - 40%-59% impaired, limited or restricted    - GOAL STATUS: CI - 1%-19% impaired, limited or restricted    - D/C STATUS:  ---------------To be determined---------------  Payor: [employer] / [place] [employer] [place] / Product Type: Managed Care Medicare /      Medical Necessity:     · Patient demonstrates good rehab potential due to higher previous functional level. Reason for Services/Other Comments:  · Patient continues to require skilled intervention due to decreased independence with ADL/functional transfers. Use of outcome tool(s) and clinical judgement create a POC that gives a: MODERATE COMPLEXITY         TREATMENT:   (In addition to Assessment/Re-Assessment sessions the following treatments were rendered)     Pre-treatment Symptoms/Complaints:    Pain: Initial:   Pain Intensity 1: 0  Post Session:  same     Group Therapeutic Exercise: (10 minutes):  Exercises per grid below to improve mobility, strength and activity tolerance. Required minimal visual, verbal and tactile cues to promote proper body alignment and promote proper body mechanics. Progressed resistance and repetitions as indicated.     UE Exercises (with yellow theraband) Date:  5/16/2018 Date:   Date:     Activity/Exercise Parameters Parameters Parameters   Shoulder Abd/Adduction 10 reps      Shoulder Flexion 10 reps AROM     Elbow Flexion 10 reps     Punches 10 reps AROM                             Braces/Orthotics/Lines/Etc:   · IV  · O2 Device: Room air  Treatment/Session Assessment:    · Response to Treatment:  Pt tolerated well   · Interdisciplinary Collaboration:   o Certified Occupational Therapy Assistant  o Registered Nurse  · After treatment position/precautions:   o Up in chair  o Bed alarm/tab alert on  o Bed/Chair-wheels locked  o Call light within reach   · Compliance with Program/Exercises: Will assess as treatment progresses. · Recommendations/Intent for next treatment session: \"Next visit will focus on advancements to more challenging activities and reduction in assistance provided\".   Total Treatment Duration:  OT Patient Time In/Time Out  Time In: 1035  Time Out: Ronny Simon

## 2018-05-16 NOTE — PROGRESS NOTES
Problem: Interdisciplinary Rounds  Goal: Interdisciplinary Rounds  Outcome: Progressing Towards Goal  Interdisciplinary team rounds were held 5/16/2018 with the following team members:Care Management, Nursing, Physical Therapy, Physician and Clinical Coordinator and the patient. Plan of care discussed. See clinical pathway and/or care plan for interventions and desired outcomes.

## 2018-05-16 NOTE — PROGRESS NOTES
Pt is quietly resting in bed. Respirations are even and unlabored. Pt has no c/o pain or needs expressed at this time. Pt is stable. Safety measures in place. Will continue to monitor.

## 2018-05-16 NOTE — PROGRESS NOTES
Problem: Falls - Risk of  Goal: *Absence of Falls  Document Alex Fall Risk and appropriate interventions in the flowsheet.    Outcome: Progressing Towards Goal  Fall Risk Interventions:  Mobility Interventions: Assess mobility with egress test, Bed/chair exit alarm, Communicate number of staff needed for ambulation/transfer, OT consult for ADLs, Patient to call before getting OOB, PT Consult for mobility concerns, PT Consult for assist device competence, Strengthening exercises (ROM-active/passive), Utilize walker, cane, or other assitive device, Utilize gait belt for transfers/ambulation    Mentation Interventions: Adequate sleep, hydration, pain control, Bed/chair exit alarm, Door open when patient unattended, Evaluate medications/consider consulting pharmacy, Eyeglasses and hearing aids, Familiar objects from home, Family/sitter at bedside, Gait belt with transfers/ambulation, Increase mobility, More frequent rounding, Reorient patient, Room close to nurse's station, Self-releasing belt, Toileting rounds, Update white board    Medication Interventions: Assess postural VS orthostatic hypotension, Bed/chair exit alarm, Evaluate medications/consider consulting pharmacy, Patient to call before getting OOB, Teach patient to arise slowly, Utilize gait belt for transfers/ambulation    Elimination Interventions: Bed/chair exit alarm, Call light in reach, Elevated toilet seat, Patient to call for help with toileting needs, Toilet paper/wipes in reach, Toileting schedule/hourly rounds    History of Falls Interventions: Bed/chair exit alarm

## 2018-05-16 NOTE — PROGRESS NOTES
Per monitor room, Pt is vacillating between afib and normal sinus. MD Samm Svaage notified. Will continue to monitor.

## 2018-05-17 LAB
ANION GAP SERPL CALC-SCNC: 9 MMOL/L (ref 7–16)
BUN SERPL-MCNC: 24 MG/DL (ref 8–23)
CALCIUM SERPL-MCNC: 9.4 MG/DL (ref 8.3–10.4)
CHLORIDE SERPL-SCNC: 111 MMOL/L (ref 98–107)
CO2 SERPL-SCNC: 24 MMOL/L (ref 21–32)
CREAT SERPL-MCNC: 1.6 MG/DL (ref 0.6–1)
GLUCOSE SERPL-MCNC: 92 MG/DL (ref 65–100)
POTASSIUM SERPL-SCNC: 4.2 MMOL/L (ref 3.5–5.1)
SODIUM SERPL-SCNC: 144 MMOL/L (ref 136–145)

## 2018-05-17 PROCEDURE — 65660000000 HC RM CCU STEPDOWN

## 2018-05-17 PROCEDURE — 77030018719 HC DRSG PTCH ANTIMIC J&J -A

## 2018-05-17 PROCEDURE — 77030018786 HC NDL GD F/USND BARD -B

## 2018-05-17 PROCEDURE — 74011250637 HC RX REV CODE- 250/637: Performed by: INTERNAL MEDICINE

## 2018-05-17 PROCEDURE — C1751 CATH, INF, PER/CENT/MIDLINE: HCPCS

## 2018-05-17 PROCEDURE — 36415 COLL VENOUS BLD VENIPUNCTURE: CPT | Performed by: FAMILY MEDICINE

## 2018-05-17 PROCEDURE — 74011000258 HC RX REV CODE- 258: Performed by: INTERNAL MEDICINE

## 2018-05-17 PROCEDURE — 76937 US GUIDE VASCULAR ACCESS: CPT

## 2018-05-17 PROCEDURE — 74011250636 HC RX REV CODE- 250/636: Performed by: INTERNAL MEDICINE

## 2018-05-17 PROCEDURE — 74011250637 HC RX REV CODE- 250/637: Performed by: FAMILY MEDICINE

## 2018-05-17 PROCEDURE — 97530 THERAPEUTIC ACTIVITIES: CPT

## 2018-05-17 PROCEDURE — 80048 BASIC METABOLIC PNL TOTAL CA: CPT | Performed by: FAMILY MEDICINE

## 2018-05-17 RX ORDER — SODIUM CHLORIDE 0.9 % (FLUSH) 0.9 %
10 SYRINGE (ML) INJECTION EVERY 8 HOURS
Status: DISCONTINUED | OUTPATIENT
Start: 2018-05-17 | End: 2018-05-18 | Stop reason: HOSPADM

## 2018-05-17 RX ORDER — HEPARIN 100 UNIT/ML
300 SYRINGE INTRAVENOUS AS NEEDED
Status: DISCONTINUED | OUTPATIENT
Start: 2018-05-17 | End: 2018-05-18 | Stop reason: HOSPADM

## 2018-05-17 RX ORDER — SODIUM CHLORIDE 0.9 % (FLUSH) 0.9 %
10 SYRINGE (ML) INJECTION AS NEEDED
Status: DISCONTINUED | OUTPATIENT
Start: 2018-05-17 | End: 2018-05-18 | Stop reason: HOSPADM

## 2018-05-17 RX ORDER — HEPARIN 100 UNIT/ML
300 SYRINGE INTRAVENOUS EVERY 8 HOURS
Status: DISCONTINUED | OUTPATIENT
Start: 2018-05-17 | End: 2018-05-18 | Stop reason: HOSPADM

## 2018-05-17 RX ADMIN — Medication 10 ML: at 17:43

## 2018-05-17 RX ADMIN — APIXABAN 2.5 MG: 2.5 TABLET, FILM COATED ORAL at 08:47

## 2018-05-17 RX ADMIN — POTASSIUM CHLORIDE 20 MEQ: 20 TABLET, EXTENDED RELEASE ORAL at 08:47

## 2018-05-17 RX ADMIN — LORATADINE 10 MG: 10 TABLET ORAL at 08:47

## 2018-05-17 RX ADMIN — METOPROLOL TARTRATE 25 MG: 25 TABLET ORAL at 17:40

## 2018-05-17 RX ADMIN — Medication 300 UNITS: at 17:39

## 2018-05-17 RX ADMIN — ACETAMINOPHEN 650 MG: 325 TABLET ORAL at 17:48

## 2018-05-17 RX ADMIN — DRONEDARONE 400 MG: 400 TABLET, FILM COATED ORAL at 17:40

## 2018-05-17 RX ADMIN — CEFTRIAXONE SODIUM 2 G: 2 INJECTION, POWDER, FOR SOLUTION INTRAMUSCULAR; INTRAVENOUS at 17:39

## 2018-05-17 RX ADMIN — SODIUM CHLORIDE 50 ML/HR: 450 INJECTION, SOLUTION INTRAVENOUS at 04:32

## 2018-05-17 RX ADMIN — AMLODIPINE BESYLATE 5 MG: 5 TABLET ORAL at 08:47

## 2018-05-17 RX ADMIN — ACETAMINOPHEN 650 MG: 325 TABLET ORAL at 13:37

## 2018-05-17 RX ADMIN — Medication 5 ML: at 13:37

## 2018-05-17 RX ADMIN — Medication 5 ML: at 04:49

## 2018-05-17 RX ADMIN — METOPROLOL TARTRATE 25 MG: 25 TABLET ORAL at 08:47

## 2018-05-17 RX ADMIN — Medication 10 ML: at 21:20

## 2018-05-17 RX ADMIN — DONEPEZIL HYDROCHLORIDE 5 MG: 5 TABLET, FILM COATED ORAL at 21:19

## 2018-05-17 RX ADMIN — DRONEDARONE 400 MG: 400 TABLET, FILM COATED ORAL at 08:47

## 2018-05-17 RX ADMIN — APIXABAN 2.5 MG: 2.5 TABLET, FILM COATED ORAL at 17:40

## 2018-05-17 RX ADMIN — Medication 5 ML: at 17:43

## 2018-05-17 RX ADMIN — Medication 5 ML: at 21:20

## 2018-05-17 RX ADMIN — Medication 300 UNITS: at 21:20

## 2018-05-17 RX ADMIN — POTASSIUM CHLORIDE 20 MEQ: 20 TABLET, EXTENDED RELEASE ORAL at 17:40

## 2018-05-17 NOTE — PROGRESS NOTES
Problem: Mobility Impaired (Adult and Pediatric)  Goal: *Acute Goals and Plan of Care (Insert Text)  STG:  (1.)Ms. Mirna Hatch will move from supine to sit and sit to supine , scoot up and down and roll side to side with MINIMAL ASSIST within 3 treatment day(s). (2.)Ms. Mirna Hatch will transfer from bed to chair and chair to bed with MINIMAL ASSIST using the least restrictive device within 3 treatment day(s). MET 5/14/18  (3.)Ms. Mirna Hatch will ambulate with MINIMAL ASSIST for 40 feet with the least restrictive device within 3 treatment day(s). LTG:  (1.)Ms. Mirna Hatch will move from supine to sit and sit to supine , scoot up and down and roll side to side in bed with STAND BY ASSIST within 7 treatment day(s). (2.)Ms. Mirna Hatch will transfer from bed to chair and chair to bed with STAND BY ASSIST using the least restrictive device within 7 treatment day(s). (3.)Ms. Mirna Hatch will ambulate with STAND BY ASSIST for 75 feet with the least restrictive device within 7 treatment day(s). ________________________________________________________________________________________________      PHYSICAL THERAPY: Daily Note, Treatment Day: 4th, AM 5/17/2018  INPATIENT: Hospital Day: 7  Payor: [employer]US / [place] [employer] [place] / Product Type: Managed Care Medicare /      NAME/AGE/GENDER: Dejah Olivas is a 80 y.o. female   PRIMARY DIAGNOSIS: Weakness  Bacteremia Bacteremia due to Streptococcus Bacteremia due to Streptococcus        ICD-10: Treatment Diagnosis:    · Generalized Muscle Weakness (M62.81)  · Difficulty in walking, Not elsewhere classified (R26.2)  · Other abnormalities of gait and mobility (R26.89)   Precaution/Allergies:  Lipitor [atorvastatin] and Pcn [penicillins]      ASSESSMENT:     Ms. Mirna Hatch was supine upon contact and agreeable to PT. Patient not doing as well today. Patient has lap belt in place and reports right knee pain.  Patient requires increased assistance and unable to ambulate as far today due to aforementioned. Patient able to perform supine to sit with max assist, additional time, and cues for improved/proper technique. Patient was then able to transfer to standing with min assist and ambulate 4' to recliner chair with use of rolling walker, min-mod assist and cues for posture/sequencing with rolling walker. Attempted to take patient to group exercises but patient had returned to supine when therapist returned. Overall slow progress towards physical therapy goals. No goals have been met thus far. Will continue efforts. This section established at most recent assessment   PROBLEM LIST (Impairments causing functional limitations):  1. Decreased Strength  2. Decreased ADL/Functional Activities  3. Decreased Transfer Abilities  4. Decreased Ambulation Ability/Technique  5. Decreased Balance  6. Decreased Activity Tolerance  7. Decreased Cognition   INTERVENTIONS PLANNED: (Benefits and precautions of physical therapy have been discussed with the patient.)  1. Balance Exercise  2. Bed Mobility  3. Family Education  4. Gait Training  5. Therapeutic Activites  6. Therapeutic Exercise/Strengthening  7. Transfer Training  8. Group Therapy     TREATMENT PLAN: Frequency/Duration: 3 times a week for duration of hospital stay  Rehabilitation Potential For Stated Goals: Good     RECOMMENDED REHABILITATION/EQUIPMENT: (at time of discharge pending progress): Due to the probability of continued deficits (see above) this patient will likely need continued skilled physical therapy after discharge. Equipment:    Walkers, Type: Rolling Walker              HISTORY:   History of Present Injury/Illness (Reason for Referral):  Per H&P: \"  HISTORY OF PRESENT ILLNESS:   Dannielle Daniels is an 80 y.o. female with a past medical history of atrial fibrillation, HTN, and hx of DVT/PE who presents to the ER with her daughter with complaint of weakness and shortness of breath over the past 2-3 days.  The patient is unable to give much more information, mostly mumbling \"yes\" or \"no. \" She denies any pain except in her knees. The patient lives alone, but her daughter lives in [place] and checks in on her frequently. Earlier today, the patient slid down into her floor and could not get up, prompting an EMS call. She still feels very weak and requires assistance sitting up in bed. Denies any fevers, chills, nausea, vomiting, diarrhea, constipation, abdominal pain, chest pain. Denies cough.     REVIEW OF SYSTEMS: Comprehensive ROS performed and negative except as stated in HPI. \"  Past Medical History/Comorbidities:   Ms. Rosa Cortez  has a past medical history of Anemia (4/21/2016); Anemia due to blood loss, acute (2014); Arthritis; Atrial fibrillation ([facility] (4/21/2016); Cancer [facility]) (Jan 2002); DVT (deep venous thrombosis) ([facility] (7/2014); Dyspnea on exertion; Edema; Environmental allergies; GERD (gastroesophageal reflux disease); HLD (hyperlipidemia) (4/21/2016); Hyperlipidemia; Hypertension; Hypokalemia (2014); Incisional hernia; Low serum sodium (2014); Melena (2014); PE (pulmonary embolism) (7/2014); Pulmonary HTN ([facility] [facility] Tricuspid regurgitation; and Vitamin D deficiency. Ms. Rosa Cortez  has a past surgical history that includes hx bipin and bso (1971); hx urological (2002); and hx hernia repair (6/15/12). Social History/Living Environment:   Home Environment: Private residence  # Steps to Enter: 2  Rails to Enter: Yes  Hand Rails : Right  One/Two Story Residence: One story  Living Alone: Yes  Support Systems: Child(claudia), Home care staff  Patient Expects to be Discharged to[de-identified] Unknown  Current DME Used/Available at Home: Cane, quad, [place], straight, Shower chair  Tub or Shower Type: Tub/Shower combination  Prior Level of Function/Work/Activity:  Lives alone with home care staff in 8 hr/day weekdays. Gets some assistance for bathing and ambulates with a cane.      Number of Personal Factors/Comorbidities that affect the Plan of Care:  Lives alone 1-2: MODERATE COMPLEXITY   EXAMINATION:   Most Recent Physical Functioning:   Gross Assessment:                  Posture:     Balance:  Sitting: Impaired  Sitting - Static: Fair (occasional)  Sitting - Dynamic: Fair (occasional)  Standing: Impaired; With support  Standing - Static: Constant support;Poor  Standing - Dynamic : Poor Bed Mobility:  Supine to Sit: Maximum assistance  Wheelchair Mobility:     Transfers:  Sit to Stand: Minimum assistance  Stand to Sit: Minimum assistance  Gait:     Base of Support: Widened  Speed/Alena: Slow;Shuffled  Step Length: Left shortened;Right shortened  Gait Abnormalities: Decreased step clearance;Trunk sway increased; Path deviations; Shuffling gait  Distance (ft): 4 Feet (ft)  Assistive Device: Walker, rolling  Ambulation - Level of Assistance: Minimal assistance; Moderate assistance  Interventions: Safety awareness training; Tactile cues; Verbal cues      Body Structures Involved:  1. Metabolic  2. Endocrine  3. Muscles Body Functions Affected:  1. Mental  2. Neuromusculoskeletal  3. Movement Related  4. Metobolic/Endocrine Activities and Participation Affected:  1. General Tasks and Demands  2. Mobility  3. Self Care  4. Domestic Life  5. Community, Social and [place]   Number of elements that affect the Plan of Care: 4+: HIGH COMPLEXITY   CLINICAL PRESENTATION:   Presentation: Stable and uncomplicated: LOW COMPLEXITY   CLINICAL DECISION MAKIN [facility] Short Form  How much difficulty does the patient currently have. .. Unable A Lot A Little None   1. Turning over in bed (including adjusting bedclothes, sheets and blankets)? [] 1   [] 2   [] 3   [x] 4   2. Sitting down on and standing up from a chair with arms ( e.g., wheelchair, bedside commode, etc.)   [] 1   [x] 2   [] 3   [] 4   3. Moving from lying on back to sitting on the side of the bed?    [] 1   [x] 2   [] 3   [] 4   How much help from another person does the patient currently need. .. Total A Lot A Little None   4. Moving to and from a bed to a chair (including a wheelchair)? [] 1   [x] 2   [] 3   [] 4   5. Need to walk in hospital room? [] 1   [x] 2   [] 3   [] 4   6. Climbing 3-5 steps with a railing? [x] 1   [] 2   [] 3   [] 4   © 2007, Trustees of [address] [place] [address] [address] [address], under license to [employer]. All rights reserved      Score:  Initial: 13 Most Recent: X (Date: -- )    Interpretation of Tool:  Represents activities that are increasingly more difficult (i.e. Bed mobility, Transfers, Gait). Score 24 23 22-20 19-15 14-10 9-7 6     Modifier CH CI CJ CK CL CM CN      ? Mobility - Walking and Moving Around:     - CURRENT STATUS: CL - 60%-79% impaired, limited or restricted    - GOAL STATUS: CK - 40%-59% impaired, limited or restricted    - D/C STATUS:  ---------------To be determined---------------  Payor: [employer] PLUS [place] [employer] [place] / Product Type: Managed Care Medicare /      Medical Necessity:     · Patient demonstrates good rehab potential due to higher previous functional level. Reason for Services/Other Comments:  · Patient continues to require skilled intervention due to decreased balance and functional mobility. Use of outcome tool(s) and clinical judgement create a POC that gives a: Clear prediction of patient's progress: LOW COMPLEXITY            TREATMENT:   (In addition to Assessment/Re-Assessment sessions the following treatments were rendered)   Pre-treatment Symptoms/Complaints:  No complaints of pain  Pain: Initial:   Pain Intensity 1: 0  Post Session:  No complaints     Therapeutic Activity: (    16 minutes):   Therapeutic activities including bed mobility training, transfer training, ambulation on level ground, posture training, instruction in sequencing with rolling walker, scooting, and patient education to improve mobility, strength, balance and activity tolerance. Required minimal Safety awareness training; Tactile cues; Verbal cues to promote static and dynamic balance in standing and promote coordination of bilateral, lower extremity(s)     Therapeutic Exercise: (  Minutes):  Exercises per grid below to improve mobility, strength and balance. Required moderate visual, verbal, manual and tactile cues to promote proper body alignment, promote proper body posture and promote proper body mechanics. Progressed range, repetitions and complexity of movement as indicated. Date:  5/16/18 Date:   Date:     ACTIVITY/EXERCISE AM PM AM PM AM PM   Ambulation:           Distance  Device  Duration         Seated Heel Raises x15B A        Seated Toe Raises x15B A        Seated Long Arc Quads x15B A        Seated Marching x15B A  Cues to perform properly        Seated Hip Abduction x15B A                 B = bilateral; AA = active assistive; A = active; P = passive          Braces/Orthotics/Lines/Etc:   · IV  · O2 Device: Room air  Treatment/Session Assessment:    · Response to Treatment:  See above  · Interdisciplinary Collaboration:   o Physical Therapy Assistant  o Registered Nurse  · After treatment position/precautions:   o Up in chair  o Bed alarm/tab alert on  o Bed/Chair-wheels locked  o Call light within reach  o RN notified  o lap belt in place   · Compliance with Program/Exercises: Will assess as treatment progresses. · Recommendations/Intent for next treatment session: \"Next visit will focus on advancements to more challenging activities and reduction in assistance provided\".   Total Treatment Duration:  PT Patient Time In/Time Out  Time In: 0922  Time Out: 1420 Juaquin Trujillo PTA

## 2018-05-17 NOTE — PROGRESS NOTES
PICC line placed by IV team patient tolerated procedure well. Peripheral IV removed with cath intact. Patient co pain in her Rt knee and ankle po tylenol given with relief. Patient position for comfort. No distress. RN will continue to monitor.

## 2018-05-17 NOTE — PROGRESS NOTES
Problem: Nutrition Deficit  Goal: *Optimize nutritional status  Nutrition  Reason for assessment: Follow Up  Assessment:   Diet order(s): regular with ensure enlive TID  Food/Nutrition Patient History:  Patient's appetite is doing ok. She is consuming the Ensure with encouragement from daughter. She has no po difficulties identified at this time. Anthropometrics:Height: 5' 2\" (157.5 cm),  Weight: 69.6 kg (153 lb 6.4 oz), Weight Source: Bed. Edema: Trace to BLEs  Macronutrient needs:  EER:  4362-7219 kcal /day (20-25 kcal/kg listed BW)  EPR:  40-60 grams protein/day (0.8-1.2 grams/kg IBW)(GFR 52)-h/o CKD  Intake/Comparative Standards: Based on the past 5 days/12 recorded meals: 75%. This potentially meets ~100% of estimated kcal and protein needs.     Intervention:  Meals and snacks: Continue current diet. Nutrition Supplement Therapy: continue ensure enlive TID  Discharge nutrition plan: Too soon to determine. Cecy Woods [address], [address] [place] [address],    [phone]

## 2018-05-17 NOTE — PROGRESS NOTES
Hospitalist Progress Note     Admit Date:  2018 11:34 AM   Name:  Magaly Woods   Age:  80 y.o.  :  1931   MRN:  [id number]   PCP:  Segun Anand MD  Treatment Team: Attending Provider: Tamera Choudhury MD; Utilization Review: Greg Crain RN; Consulting Provider: Zuleyka Cunningham MD; Care Manager: Mamta Tabares. Stephen Green; Utilization Review: Tami Funez    Subjective: This is an 81 YO female patient with a PMH of afib on Eliquis and dronedarone, dementia, previous DVT and PE admitted to the hospital due to SOB and fatigue and poor appetite,  Blood culture reported GBS bacteriemia. Switched to IV ceftriaxone on . ECHO ordered and showed NO evidence of vegetations. ID consulted, source of infection NOT clear. Patient has R knee pain. Arthrocentesis recommended. Ortho consulted. Has bad sundown with severe agitation at night.     5/15/18  Pt awake,alert,says doing ok    18  Pt sitting in chair- says doing ok  Had ct abd/pelvis on  5/15/18- no acute etiology    18  C/o mild rt knee pain  Worsening creatinine    Objective:     Patient Vitals for the past 24 hrs:   Temp Pulse Resp BP SpO2   18 1502 98.8 °F (37.1 °C) 65 18 124/64 97 %   18 1121 98 °F (36.7 °C) (!) 59 18 113/69 96 %   18 0730 98.1 °F (36.7 °C) 64 18 136/65 97 %   18 0313 98 °F (36.7 °C) 60 18 127/59 95 %   18 2319 97.7 °F (36.5 °C) 60 18 140/63 98 %   18 1910 97.8 °F (36.6 °C) 69 18 126/63 98 %     Oxygen Therapy  O2 Sat (%): 97 % (18 1502)  Pulse via Oximetry: 106 beats per minute (18 2131)  O2 Device: Room air (18 1140)    Intake/Output Summary (Last 24 hours) at 18 1712  Last data filed at 18 0432   Gross per 24 hour   Intake             1194 ml   Output                0 ml   Net             1194 ml         General:    Well nourished. Alert.    heent- normal  CV:   RRR. No murmur, rub, or gallop.   Lungs:   Mild crep basal, no wheezing  Abdomen:   Soft, nontender, nondistended. Cns- no focal neurological deficits  Extremities: Warm and dry. No cyanosis or edema. Minimal tenderness medical aspect rt knee- no swelling noted  Skin:     No rashes or jaundice. Data Review:  I have reviewed all labs, meds, telemetry events, and studies from the last 24 hours.     Recent Results (from the past 24 hour(s))   METABOLIC PANEL, BASIC    Collection Time: 05/17/18  5:49 AM   Result Value Ref Range    Sodium 144 136 - 145 mmol/L    Potassium 4.2 3.5 - 5.1 mmol/L    Chloride 111 (H) 98 - 107 mmol/L    CO2 24 21 - 32 mmol/L    Anion gap 9 7 - 16 mmol/L    Glucose 92 65 - 100 mg/dL    BUN 24 (H) 8 - 23 MG/DL    Creatinine 1.60 (H) 0.6 - 1.0 MG/DL    GFR est AA 39 (L) >60 ml/min/1.73m2    GFR est non-AA 32 (L) >60 ml/min/1.73m2    Calcium 9.4 8.3 - 10.4 MG/DL        All Micro Results     Procedure Component Value Units Date/Time    CULTURE, BODY FLUID Fredick Anger STAIN [[id number]] Collected:  05/14/18 1240    Order Status:  Completed Specimen:  Joint Fluid Updated:  05/15/18 0733     Special Requests: NO SPECIAL REQUESTS        GRAM STAIN NO WBCS SEEN         NO DEFINITE ORGANISM SEEN        Culture result: NO GROWTH 1 DAY       CULTURE, BLOOD [[id number]] Collected:  05/13/18 1125    Order Status:  Completed Specimen:  Blood from Blood Updated:  05/15/18 0731     Special Requests: --        LEFT  Antecubital       Culture result: NO GROWTH 2 DAYS       CULTURE, BLOOD [[id number]] Collected:  05/13/18 1131    Order Status:  Completed Specimen:  Blood from Blood Updated:  05/15/18 0731     Special Requests: --        RIGHT  HAND       Culture result: NO GROWTH 2 DAYS       Deloras Gene STAIN [[id number]] Collected:  05/14/18 1240    Order Status:  Canceled Specimen:  Joint Fluid     CULTURE, BLOOD [[id number]]  (Abnormal) Collected:  05/11/18 1259    Order Status:  Completed Specimen:  Blood from Blood Updated:  05/13/18 3801     Special Requests: -- RIGHT  Antecubital       GRAM STAIN GRAM POSITIVE COCCI                 AEROBIC AND ANAEROBIC BOTTLES              CRITICAL RESULT NOT CALLED DUE TO PREVIOUS NOTIFICATION OF CRITICAL RESULT WITHIN THE LAST 24 HOURS.      Culture result:         STREPTOCOCCUS AGALACTIAE SERO GROUP B (A)            For Susceptibility Refer to Culture  ACCESSION [id number]      CULTURE, BLOOD [[id number]]  (Abnormal)  (Susceptibility) Collected:  05/11/18 1259    Order Status:  Completed Specimen:  Blood from Blood Updated:  05/13/18 0622     Special Requests: --        LEFT  Antecubital       GRAM STAIN GRAM POSITIVE COCCI                 AEROBIC AND ANAEROBIC BOTTLES              RESULTS VERIFIED, PHONED TO AND READ BACK BY Kamron Gandara RN ON 05/11/2018 AT 2242 WMR     Culture result:         STREPTOCOCCUS AGALACTIAE SERO GROUP B (A)          Current Meds:  Current Facility-Administered Medications   Medication Dose Route Frequency    sodium chloride (NS) flush 10 mL  10 mL InterCATHeter Q8H    heparin (porcine) pf 300 Units  300 Units InterCATHeter Q8H    sodium chloride (NS) flush 10 mL  10 mL InterCATHeter PRN    heparin (porcine) pf 300 Units  300 Units InterCATHeter PRN    potassium chloride (K-DUR, KLOR-CON) SR tablet 20 mEq  20 mEq Oral BID    LORazepam (ATIVAN) injection 1 mg  1 mg IntraVENous Q6H PRN    haloperidol lactate (HALDOL) injection 2 mg  2 mg IntraVENous QHS PRN    0.45% sodium chloride infusion  125 mL/hr IntraVENous CONTINUOUS    cefTRIAXone (ROCEPHIN) 2 g in 0.9% sodium chloride (MBP/ADV) 50 mL  2 g IntraVENous Q24H    amLODIPine (NORVASC) tablet 5 mg  5 mg Oral DAILY    apixaban (ELIQUIS) tablet 2.5 mg  2.5 mg Oral BID    donepezil (ARICEPT) tablet 5 mg  5 mg Oral QHS    dronedarone (MULTAQ) tablet tab 400 mg  400 mg Oral BID WITH MEALS    loratadine (CLARITIN) tablet 10 mg  10 mg Oral DAILY    metoprolol tartrate (LOPRESSOR) tablet 25 mg  25 mg Oral BID    sodium chloride (NS) flush 5-10 mL 5-10 mL IntraVENous Q8H    sodium chloride (NS) flush 5-10 mL  5-10 mL IntraVENous PRN    acetaminophen (TYLENOL) tablet 650 mg  650 mg Oral Q4H PRN    ondansetron (ZOFRAN) injection 4 mg  4 mg IntraVENous Q4H PRN    magnesium hydroxide (MILK OF MAGNESIA) 400 mg/5 mL oral suspension 30 mL  30 mL Oral DAILY PRN       Other Studies (last 24 hours):  No results found. Assessment and Plan:     Hospital Problems as of 5/17/2018  Date Reviewed: 7/8/2016          Codes Class Noted - Resolved POA    * (Principal)Bacteremia due to Streptococcus ICD-10-CM: R78.81, B95.5  ICD-9-CM: 790.7, 041.00  5/12/2018 - Present Unknown        Bacteremia ICD-10-CM: R78.81  ICD-9-CM: 790.7  5/12/2018 - Present Unknown        Weakness ICD-10-CM: R53.1  ICD-9-CM: 780.79  5/11/2018 - Present Yes        Leukocytosis ICD-10-CM: D72.829  ICD-9-CM: 288.60  5/11/2018 - Present Yes        HLD (hyperlipidemia) ICD-10-CM: E78.5  ICD-9-CM: 272.4  4/21/2016 - Present Yes        Atrial fibrillation (HCC) ICD-10-CM: I48.91  ICD-9-CM: 427.31  4/21/2016 - Present Yes        CKD (chronic kidney disease) stage 5, GFR less than 15 ml/min (HCC) ICD-10-CM: N18.5  ICD-9-CM: 585.5  7/9/2015 - Present Yes    Overview Addendum 4/21/2016  8:04 PM by Stacey Carbajal MD     Followed by Dr. Karan Ruiz. Prior left nephrectomy. Pulmonary hypertension (HCC) (Chronic) ICD-10-CM: I27.20  ICD-9-CM: 416.8  8/4/2014 - Present Yes        DVT (deep venous thrombosis) (HCC) (Chronic) ICD-10-CM: I82.409  ICD-9-CM: 453.40  8/1/2014 - Present Yes    Overview Addendum 4/27/2017  2:00 PM by Stacey Carbajal MD     1. LE Ultrasound (7/31/14) : Occlusive and nonocclusive thrombus within the deep venous system of the left lower extremity. Pulmonary embolism (HCC) (Chronic) ICD-10-CM: I26.99  ICD-9-CM: 415.19  7/30/2014 - Present Yes    Overview Signed 8/1/2014  7:36 AM by John Paul Chaparro NP     7/30/14 VQ scan:  1.  Abnormal perfusion defect involving the entire right upper lobe. The right   upper lobe contains 3 segments and therefore this would be consistent with 3   large perfusion defects. Therefore, this would be considered a high probability   for pulmonary embolism study. HTN (hypertension) (Chronic) ICD-10-CM: I10  ICD-9-CM: 401.9  7/30/2014 - Present Yes    Overview Signed 4/27/2017  2:01 PM by Isabel Cherry MD     Echo (1/22/15):  EF 50-55%. AVSC. Mild/moderate TR.  RVSP 50                   PLAN:    -STREPTOCOCCUS AGALACTIAE SERO GROUP B (A)- bacteremia- on rocephin-- echo no vegetations-con rocephin as per ID recommendations. Awaiting PICC line  - acute on ckd 3--worsening- increase ivf  -rt knee aspirate- pending culture  - ams- resolved  -Hx cerviocal/renal cancer, s/p chemo/XRT, left nephrectomy 2002  - afib on eliquis  - htn  - hypernatremia- resolved.     DC planning/Dispo:    DVT ppx:  eliquis    Signed:  Elias Koch MD

## 2018-05-17 NOTE — PROGRESS NOTES
RN received patient in stable condition. Patient alert to name. RN orientated to place date time purpose but she is forgetful and unable to recall. Patient denies co pain. Patients Lung sounds diminish. Tele-monitor remains on. Patient appetite poor. IV fluids infusing well. No edema. No s/s of discomfort. Patient falling back to asleep after assessment. No distress. Call light with in reach. RN will continue to monitor.

## 2018-05-17 NOTE — PROGRESS NOTES
Infectious Disease Progress Note    Today's Date: 2018   Admit Date: 2018    Impression:   · Group B strep bacteremia (), source unknown -  BCX negative;  TTE: mild MR, mod TR; no veg. CT abd negative  · R knee pain - s/p R knee aspirate, , 319 WBCs; Cx: NGTD  · Fever/leukocytosis: resolved  · AMS/delirium - improved  · Hx cervical/renal cancer, s/p chemo/XRT, left nephrectomy   · CKD 3, CrCl ~30  · PCN allergy: rash/skin peeling    Plan:   · Continue ceftriaxone. Plan for 2 weeks from negative [address] with EOT 18. · Per nephro ok to place PICC  · Since repeat BCX are negative, will not plan for SILVIO. · Dispo: [email or web address] planned. No ID follow up needed, recommended PICC removal at EOT  · ID will sign off, please call with questions or concerns      Anti-infectives:   Ceftriaxone -  Vanc -    Subjective:   Date of Consultation:  May 17, 2018  Referring Physician: Dr Shira Rollins    Reports R knee discomfort, recent pain medication. No fever, chills, nausea or diarrhea    Allergies   Allergen Reactions    Lipitor [Atorvastatin] Unable to Obtain    Pcn [Penicillins] Rash     With peeling skin        Review of Systems:  Pertinent items are noted in the History of Present Illness. Objective:     Visit Vitals    /69    Pulse (!) 59    Temp 98 °F (36.7 °C)    Resp 18    Ht 5' 2\" (1.575 m)    Wt 69.6 kg (153 lb 6.4 oz)    SpO2 96%    BMI 28.06 kg/m2     Temp (24hrs), Av.9 °F (36.6 °C), Min:97.7 °F (36.5 °C), Max:98.1 °F (36.7 °C)       Lines:  Peripheral IV:  LEft arm intact          Physical Exam:    General:  Alert, cooperative, sitting in bed, NAD;   Eyes:  Sclera anicteric.    Mouth/Throat: oral pharynx clear   Lungs:   Breathing comfortably, anterior fields CTA   CV:  Regular rate and rhythm, no murmur   Abdomen:   non-distended   Extremities: No cyanosis, R knee: pain with certain movements   Skin: no acute rash or lesions   Musculoskeletal: No swelling or deformity   Lines/Devices:  Intact, no erythema, drainage or tenderness   Psych: Alert and responsive; intermittently confused/forgetful       Data Review:     CBC:  Recent Labs      05/15/18   0629   WBC  5.0   HGB  13.4   HCT  41.2   PLT  244       BMP:  Recent Labs      05/17/18   0549  05/16/18   1207  05/15/18   0629   CREA  1.60*  1.50*  1.15*   BUN  24*  19  13   NA  144  143  144   K  4.2  4.3  3.5   CL  111*  108*  108*   CO2  24  25  24   AGAP  9  10  12   GLU  92  102*  113*       LFTS:  No results for input(s): TBILI, ALT, SGOT, AP, TP, ALB in the last 72 hours. Microbiology:     All Micro Results     Procedure Component Value Units Date/Time    CULTURE, BLOOD [[id number]] Collected:  05/13/18 1125    Order Status:  Completed Specimen:  Blood from Blood Updated:  05/17/18 0904     Special Requests: --        LEFT  Antecubital       Culture result: NO GROWTH 4 DAYS       CULTURE, BLOOD [[id number]] Collected:  05/13/18 1131    Order Status:  Completed Specimen:  Blood from Blood Updated:  05/17/18 0904     Special Requests: --        RIGHT  HAND       Culture result: NO GROWTH 4 DAYS       CULTURE, BODY FLUID W Morgan Woody [[id number]] Collected:  05/14/18 1240    Order Status:  Completed Specimen:  Joint Fluid Updated:  05/16/18 0935     Special Requests: NO SPECIAL REQUESTS        GRAM STAIN NO WBCS SEEN         NO DEFINITE ORGANISM SEEN        Culture result: NO GROWTH 2 DAYS       Limmie Cressona STAIN [[id number]] Collected:  05/14/18 1240    Order Status:  Canceled Specimen:  Joint Fluid     CULTURE, BLOOD [[id number]]  (Abnormal) Collected:  05/11/18 1259    Order Status:  Completed Specimen:  Blood from Blood Updated:  05/13/18 0622     Special Requests: --        RIGHT  Antecubital       GRAM STAIN GRAM POSITIVE COCCI                 AEROBIC AND ANAEROBIC BOTTLES              CRITICAL RESULT NOT CALLED DUE TO PREVIOUS NOTIFICATION OF CRITICAL RESULT WITHIN THE LAST 24 HOURS.      Culture result: STREPTOCOCCUS AGALACTIAE SERO GROUP B (A)            For Susceptibility Refer to Culture  ACCESSION [id number]      CULTURE, BLOOD [[id number]]  (Abnormal)  (Susceptibility) Collected:  18 1259    Order Status:  Completed Specimen:  Blood from Blood Updated:  18 0622     Special Requests: --        LEFT  Antecubital       GRAM STAIN GRAM POSITIVE COCCI                 AEROBIC AND ANAEROBIC BOTTLES              RESULTS VERIFIED, PHONED TO AND READ BACK BY Mel Mclaughlin RN ON 2018 AT 2242 WMR     Culture result:         STREPTOCOCCUS AGALACTIAE SERO GROUP B (A)          Imagin/15/18 CT abd/pelvis: IMPRESSION:   1. No acute abnormality in the abdomen or pelvis. 2. Mild right lower lung infiltrate. 3. Chronic changes including left nephrectomy, atherosclerotic vascular disease,  partial hysterectomy, abdominal aortic aneurysm, constipation.     Signed By: Андрей Bragg NP     May 17, 2018

## 2018-05-17 NOTE — PROGRESS NOTES
PICC Placement Note    PRE-PROCEDURE VERIFICATION  Correct Procedure: yes. Time out completed with assistant Kvng Bowden RN and all persons present in agreement with time out. Correct Site:  yes  Temperature: Temp: 98.8 °F (37.1 °C), Temperature Source: Temp Source: Oral  Recent Labs      05/17/18   0549   05/15/18   0629   BUN  24*   < >  13   CREA  1.60*   < >  1.15*   PLT   --    --   244   WBC   --    --   5.0    < > = values in this interval not displayed. Allergies: Lipitor [atorvastatin] and Pcn [penicillins]  Education materials for PICC Care given to patient or family. PROCEDURE DETAIL  A single lumen PICC line was started for antibiotic therapy. The following documentation is in addition to the PICC properties in the lines/airways flowsheet :  Lot #: [id number]  xylocaine used: yes  Mid-Arm Circumference: 30 (cm)  Internal Catheter Length: 37 (cm)  Internal Catheter Total Length: 37 (cm)  Vein Selection for PICC:right basilic  Central Line Bundle followed yes  Complication Related to Insertion: none  Both the insertion guidewire and sherlock guidewire were removed intact all ports have positive blood return and were flush well with normal saline. The placement was verified by sapiens ecg. The ECG results state the tip overlies the lower superior vena cava.          Line is okay to use: yes    Scot Hong RN

## 2018-05-17 NOTE — PROGRESS NOTES
SBAR from Nathanael King., JUAN. Alert with episodes of confusion, resting in bed. Respirations even and unlabored. Denies any pain or discomfort at this time. Call light within reach. Will continue to monitor.

## 2018-05-18 VITALS
RESPIRATION RATE: 18 BRPM | BODY MASS INDEX: 29.39 KG/M2 | HEIGHT: 62 IN | SYSTOLIC BLOOD PRESSURE: 145 MMHG | TEMPERATURE: 98.4 F | OXYGEN SATURATION: 97 % | HEART RATE: 60 BPM | WEIGHT: 159.7 LBS | DIASTOLIC BLOOD PRESSURE: 77 MMHG

## 2018-05-18 LAB
ANION GAP SERPL CALC-SCNC: 8 MMOL/L (ref 7–16)
BACTERIA SPEC CULT: NORMAL
BACTERIA SPEC CULT: NORMAL
BUN SERPL-MCNC: 20 MG/DL (ref 8–23)
CALCIUM SERPL-MCNC: 9.7 MG/DL (ref 8.3–10.4)
CHLORIDE SERPL-SCNC: 111 MMOL/L (ref 98–107)
CO2 SERPL-SCNC: 25 MMOL/L (ref 21–32)
CREAT SERPL-MCNC: 1.37 MG/DL (ref 0.6–1)
GLUCOSE SERPL-MCNC: 89 MG/DL (ref 65–100)
POTASSIUM SERPL-SCNC: 4.6 MMOL/L (ref 3.5–5.1)
SERVICE CMNT-IMP: NORMAL
SERVICE CMNT-IMP: NORMAL
SODIUM SERPL-SCNC: 144 MMOL/L (ref 136–145)

## 2018-05-18 PROCEDURE — 74011250637 HC RX REV CODE- 250/637: Performed by: INTERNAL MEDICINE

## 2018-05-18 PROCEDURE — 74011000258 HC RX REV CODE- 258: Performed by: FAMILY MEDICINE

## 2018-05-18 PROCEDURE — 80048 BASIC METABOLIC PNL TOTAL CA: CPT | Performed by: FAMILY MEDICINE

## 2018-05-18 PROCEDURE — 74011250636 HC RX REV CODE- 250/636: Performed by: INTERNAL MEDICINE

## 2018-05-18 PROCEDURE — 36592 COLLECT BLOOD FROM PICC: CPT

## 2018-05-18 PROCEDURE — 74011250637 HC RX REV CODE- 250/637: Performed by: FAMILY MEDICINE

## 2018-05-18 RX ADMIN — DRONEDARONE 400 MG: 400 TABLET, FILM COATED ORAL at 08:52

## 2018-05-18 RX ADMIN — POTASSIUM CHLORIDE 20 MEQ: 20 TABLET, EXTENDED RELEASE ORAL at 08:52

## 2018-05-18 RX ADMIN — Medication 10 ML: at 05:13

## 2018-05-18 RX ADMIN — APIXABAN 2.5 MG: 2.5 TABLET, FILM COATED ORAL at 08:52

## 2018-05-18 RX ADMIN — SODIUM CHLORIDE 125 ML/HR: 450 INJECTION, SOLUTION INTRAVENOUS at 03:15

## 2018-05-18 RX ADMIN — AMLODIPINE BESYLATE 5 MG: 5 TABLET ORAL at 08:52

## 2018-05-18 RX ADMIN — METOPROLOL TARTRATE 25 MG: 25 TABLET ORAL at 08:52

## 2018-05-18 RX ADMIN — Medication 300 UNITS: at 05:14

## 2018-05-18 RX ADMIN — LORATADINE 10 MG: 10 TABLET ORAL at 08:52

## 2018-05-18 RX ADMIN — Medication 5 ML: at 05:13

## 2018-05-18 NOTE — DISCHARGE SUMMARY
Hospitalist Discharge Summary     Admit Date:  2018 11:34 AM   Name:  Jennifer Olmedo   Age:  80 y.o.  :  1931   MRN:  [id number]   PCP:  Ang Casas MD  Treatment Team: Attending Provider: Shelby Bae MD; Utilization Review: Harman Cam RN; Consulting Provider: Tena Cedeno MD; Care Manager: Sueann Sabot. Verner Damme; Utilization Review: Vahid Cunningham    Problem List for this Hospitalization:  Hospital Problems as of 2018  Date Reviewed: 2016          Codes Class Noted - Resolved POA    * (Principal)Bacteremia due to Streptococcus ICD-10-CM: R78.81, B95.5  ICD-9-CM: 790.7, 041.00  2018 - Present Unknown        Bacteremia ICD-10-CM: R78.81  ICD-9-CM: 790.7  2018 - Present Unknown        Weakness ICD-10-CM: R53.1  ICD-9-CM: 780.79  2018 - Present Yes        Leukocytosis ICD-10-CM: D72.829  ICD-9-CM: 288.60  2018 - Present Yes        HLD (hyperlipidemia) ICD-10-CM: E78.5  ICD-9-CM: 272.4  2016 - Present Yes        Atrial fibrillation ([address] ICD-10-CM: I48.91  ICD-9-CM: 427.31  2016 - Present Yes        CKD (chronic kidney disease) stage 5, GFR less than 15 ml/min (HCC) ICD-10-CM: N18.5  ICD-9-CM: 585.5  2015 - Present Yes    Overview Addendum 2016  8:04 PM by Jacqui Hoffman MD     Followed by Dr. Minerva Valles. Prior left nephrectomy. Pulmonary hypertension (HCC) (Chronic) ICD-10-CM: I27.20  ICD-9-CM: 416.8  2014 - Present Yes        DVT (deep venous thrombosis) (HCC) (Chronic) ICD-10-CM: I82.409  ICD-9-CM: 453.40  2014 - Present Yes    Overview Addendum 2017  2:00 PM by Jacqui Hoffman MD     1. LE Ultrasound (14) : Occlusive and nonocclusive thrombus within the deep venous system of the left lower extremity.                 Pulmonary embolism (HCC) (Chronic) ICD-10-CM: I26.99  ICD-9-CM: 415.19  2014 - Present Yes    Overview Signed 2014  7:36 AM by Germaine Tony NP     14 DAVE scan:  1. Abnormal perfusion defect involving the entire right upper lobe. The right   upper lobe contains 3 segments and therefore this would be consistent with 3   large perfusion defects. Therefore, this would be considered a high probability   for pulmonary embolism study. HTN (hypertension) (Chronic) ICD-10-CM: I10  ICD-9-CM: 401.9  7/30/2014 - Present Yes    Overview Signed 4/27/2017  2:01 PM by Tania Ramirez MD     Echo (1/22/15):  EF 50-55%. AVSC. Mild/moderate TR.  RVSP 50                     Admission HPI from 5/11/2018:    Rancho Colbert is an 80 y.o. female with a past medical history of atrial fibrillation, HTN, and hx of DVT/PE who presents to the ER with her daughter with complaint of weakness and shortness of breath over the past 2-3 days. The patient is unable to give much more information, mostly mumbling \"yes\" or \"no. \" She denies any pain except in her knees. The patient lives alone, but her daughter lives in [place] and checks in on her frequently. Earlier today, the patient slid down into her floor and could not get up, prompting an EMS call. She still feels very weak and requires assistance sitting up in bed. Denies any fevers, chills, nausea, vomiting, diarrhea, constipation, abdominal pain, chest pain. Denies cough. Hospital Course: This is an 81 YO female patient with a PMH of afib on Eliquis and dronedarone, dementia, previous DVT and PE admitted to the hospital due to SOB and fatigue and poor appetite,  Blood culture reported GBS bacteriemia. Switched to IV ceftriaxone on 5/13. ECHO ordered and showed NO evidence of vegetations. ID consulted, source of infection NOT clear. Patient has R knee pain. Arthrocentesis recommended. Ortho consulted. Knee aspirate culture no growth. ID recommended rocephin with EOT 5/27/18. Pt kidney functions at discharge CKD 3- pts ivf increased during the stay- kidney functions improving. Pt has a PICC Line rt fore arm. chronic arthritis mild bilateral knee pain- rt>left. pt had episodes of confusion during the stay- presently stable. Pt is stable for discharge. Follow up instructions below. Plan was discussed with patient. All questions answered. Patient was stable at time of discharge and was instructed to call or return if there are any concerns or recurrence of symptoms. Diagnostic Imaging/Tests:   Xr Chest Sngl V    Result Date: 2018  Chest X-ray  2018 7:29 PM Clinical indication:  80year-old with low-grade fever. Comparison: 2015. Findings: Semiupright portable chest at 7:01 PM. The patient is markedly kyphotic, and the chin projects over the thoracic inlet and lung apices. The lungs are markedly underventilated with perihilar vascular crowding. No focal airspace consolidation nor edema. Stable cardiomegaly. IMPRESSION: 1. Marked underventilation and perihilar vascular crowding. Consider dedicated PA and lateral radiographs of the patient's clinical condition permits. Xr Chest Pa Lat    Result Date: 2018  Frontal and lateral views of the chest Comparison: 2018 Indication: Fever FINDINGS: Cardiac enlargement. Perihilar bronchial wall thickening without lobar consolidation. Trace effusions. No pneumothorax. No discrete acute osseous lesion seen. IMPRESSION: Favor bronchitis.        Echocardiogram results:  Results for orders placed or performed during the hospital encounter of 18   2D ECHO COMPLETE ADULT (TTE) W OR [address], [facility]  [phone]    Transthoracic Echocardiogram  2D, M-mode, Doppler, and Color Doppler    Patient: Ashley Leija  MR #: [id number]  : 1931  Age: 80 years  Gender: Female  Study date: 14-May-2018  Account #: [de-identified]  Height: 62 in  Weight: 146.7 lb  BSA: 1.68 mï¾²  Status:Routine  Location: 832  BP: 130/ 79    Allergies: ATORVASTATIN, PENICILLINS    Sonographer:  Mindy Theodore, [facility]  Group:  [facility]  Referring Physician:  Lisset Ricci MD  Reading Physician:  Erick Lang. Sim Sellers MD [facility]    INDICATIONS: Streptococcus Bacteremia    PROCEDURE: This was a routine study. A transthoracic echocardiogram was  performed. The study included complete 2D imaging, M-mode, complete spectral  Doppler, and color Doppler. Image quality was adequate. LEFT VENTRICLE: Size was normal. Systolic function was normal. Ejection  fraction was estimated in the range of 55 % to 60 %. There were no regional  wall motion abnormalities. Wall thickness was normal. The study was not  technically sufficient to allow evaluation of LV diastolic function. Average  E/e'-9. RIGHT VENTRICLE: The size was normal. Systolic function was normal. Estimated  peak pressure was in the range of 40-45 mmHg. LEFT ATRIUM: Size was normal.    RIGHT ATRIUM: Size was normal.    SYSTEMIC VEINS: IVC: The inferior vena cava was normal in size and course. AORTIC VALVE: The valve was trileaflet. Leaflets exhibited mild   calcification. There was no evidence for stenosis. There was trivial regurgitation. MITRAL VALVE: Valve structure was normal. There was no evidence for stenosis. There was mild regurgitation. TRICUSPID VALVE: The valve structure was normal. There was no evidence for  stenosis. There was moderate regurgitation. PULMONIC VALVE: Not well visualized. There was no evidence for stenosis. There  was no insufficiency. PERICARDIUM: There was no pericardial effusion. AORTA: The root exhibited normal size. SUMMARY:    -  Left ventricle: Systolic function was normal. Ejection fraction was  estimated in the range of 55 % to 60 %. There were no regional wall motion  abnormalities. -  Mitral valve: There was mild regurgitation.    -  Tricuspid valve: There was moderate regurgitation.     SYSTEM MEASUREMENT TABLES    2D mode  AoR Diam (2D): 2.7 cm  LA Dimension (2D): 3.1 cm  Left Atrium Systolic Volume Index; Method of Disks, Biplane; 2D mode;: 29.9  ml/m2  IVS/LVPW (2D): 1.5  IVSd (2D): 1.2 cm  LVIDd (2D): 4.4 cm  LVIDs (2D): 3 cm  LVOT Area (2D): 2.8 cm2  LVPWd (2D): 0.8 cm    Unspecified Scan Mode  Peak Grad; Mean; Antegrade Flow: 10 mm[Hg]  Vmax; Antegrade Flow: 160 cm/s  LVOT Diam: 1.9 cm    Prepared and signed by    Opal Chicas. MD Rigo Marquez Jazmins  Signed 14-May-2018 12:30:59           All Micro Results     Procedure Component Value Units Date/Time    CULTURE, BLOOD [[id number]] Collected:  05/13/18 1131    Order Status:  Completed Specimen:  Blood from Blood Updated:  05/18/18 0725     Special Requests: --        RIGHT  HAND       Culture result: NO GROWTH 5 DAYS       CULTURE, BLOOD [[id number]] Collected:  05/13/18 1125    Order Status:  Completed Specimen:  Blood from Blood Updated:  05/18/18 0725     Special Requests: --        LEFT  Antecubital       Culture result: NO GROWTH 5 DAYS       CULTURE, BODY FLUID W Deshaun Saucedo [[id number]] Collected:  05/14/18 1240    Order Status:  Completed Specimen:  Joint Fluid Updated:  05/16/18 0935     Special Requests: NO SPECIAL REQUESTS        GRAM STAIN NO WBCS SEEN         NO DEFINITE ORGANISM SEEN        Culture result: NO GROWTH 2 DAYS       Isidore Mulling STAIN [[id number]] Collected:  05/14/18 1240    Order Status:  Canceled Specimen:  Joint Fluid     CULTURE, BLOOD [[id number]]  (Abnormal) Collected:  05/11/18 1259    Order Status:  Completed Specimen:  Blood from Blood Updated:  05/13/18 0622     Special Requests: --        RIGHT  Antecubital       GRAM STAIN GRAM POSITIVE COCCI                 AEROBIC AND ANAEROBIC BOTTLES              CRITICAL RESULT NOT CALLED DUE TO PREVIOUS NOTIFICATION OF CRITICAL RESULT WITHIN THE LAST 24 HOURS.      Culture result:         STREPTOCOCCUS AGALACTIAE SERO GROUP B (A)            For Susceptibility Refer to Culture  ACCESSION [id number]      CULTURE, BLOOD [[id number]]  (Abnormal) (Susceptibility) Collected:  05/11/18 1259    Order Status:  Completed Specimen:  Blood from Blood Updated:  05/13/18 0622     Special Requests: --        LEFT  Antecubital       GRAM STAIN GRAM POSITIVE COCCI                 AEROBIC AND ANAEROBIC BOTTLES              RESULTS VERIFIED, PHONED TO AND READ BACK BY Mel Mclaughlin RN ON 05/11/2018 AT 2242 WMR     Culture result:         STREPTOCOCCUS AGALACTIAE SERO GROUP B (A)          Labs: Results:       BMP, Mg, Phos Recent Labs      05/18/18   0522  05/17/18   0549  05/16/18   1207   NA  144  144  143   K  4.6  4.2  4.3   CL  111*  111*  108*   CO2  25  24  25   AGAP  8  9  10   BUN  20  24*  19   CREA  1.37*  1.60*  1.50*   CA  9.7  9.4  9.7   GLU  89  92  102*      CBC No results for input(s): WBC, RBC, HGB, HCT, PLT, GRANS, LYMPH, EOS, MONOS, BASOS, IG, ANEU, ABL, MARC, ABM, ABB, AIG, HGBEXT, HCTEXT, PLTEXT in the last 72 hours. LFT No results for input(s): SGOT, ALT, TBIL, AP, TP, ALB, GLOB, AGRAT, GPT in the last 72 hours.    Cardiac Testing Lab Results   Component Value Date/Time    BNP 9 08/20/2014 11:00 AM    BNP 28 07/30/2014 09:53 AM     (H) 05/11/2018 11:54 AM     (H) 08/07/2017 03:00 PM    CK 36 08/24/2014 03:25 PM    CK - MB <0.5 (L) 08/24/2014 03:25 PM    CK-MB Index CANNOT BE CALCULATED 08/24/2014 03:25 PM    Troponin-I, Qt. <0.02 (L) 05/11/2018 11:07 PM    Troponin-I, Qt. <0.02 (L) 08/24/2014 03:25 PM    Troponin-I, Qt. <0.02 (L) 08/20/2014 11:15 AM      Coagulation Tests Lab Results   Component Value Date/Time    Prothrombin time 12.4 (H) 08/29/2014 04:31 AM    Prothrombin time 12.7 (H) 08/28/2014 06:25 AM    Prothrombin time 14.5 (H) 08/20/2014 11:15 AM    INR 1.1 08/29/2014 04:31 AM    INR 1.2 08/28/2014 06:25 AM    INR 1.3 (H) 08/20/2014 11:15 AM    aPTT 26.9 08/20/2014 11:15 AM      A1c No results found for: HBA1C, HGBE8, FPX1JASP   Lipid Panel No results found for: CHOL, CHOLPOCT, CHOLX, CHLST, CHOLV, 311736, HDL, LDL, LDLC, DLDLP, 268626, VLDLC, VLDL, TGLX, TRIGL, TRIGP, TGLPOCT, CHHD, CHHDX   Thyroid Panel Lab Results   Component Value Date/Time    TSH 2.150 08/24/2014 03:25 AM        Most Recent UA No results found for: COLOR, APPRN, REFSG, LEOLA, PROTU, GLUCU, KETU, BILU, BLDU, UROU, ALBARO, LEUKU     Allergies   Allergen Reactions    Lipitor [Atorvastatin] Unable to Obtain    Pcn [Penicillins] Rash     With peeling skin     Immunization History   Administered Date(s) Administered    Influenza Vaccine 10/01/2015    Pneumococcal Vaccine (Unspecified Type) 01/01/2012    TB Skin Test (PPD) Intradermal 08/01/2014, 05/11/2018       All Labs from Last 24 Hrs:  Recent Results (from the past 24 hour(s))   METABOLIC PANEL, BASIC    Collection Time: 05/18/18  5:22 AM   Result Value Ref Range    Sodium 144 136 - 145 mmol/L    Potassium 4.6 3.5 - 5.1 mmol/L    Chloride 111 (H) 98 - 107 mmol/L    CO2 25 21 - 32 mmol/L    Anion gap 8 7 - 16 mmol/L    Glucose 89 65 - 100 mg/dL    BUN 20 8 - 23 MG/DL    Creatinine 1.37 (H) 0.6 - 1.0 MG/DL    GFR est AA 47 (L) >60 ml/min/1.73m2    GFR est non-AA 39 (L) >60 ml/min/1.73m2    Calcium 9.7 8.3 - 10.4 MG/DL       Discharge Exam:  Patient Vitals for the past 24 hrs:   Temp Pulse Resp BP SpO2   05/18/18 0729 98.7 °F (37.1 °C) 69 17 170/82 97 %   05/18/18 0315 98.5 °F (36.9 °C) 64 18 157/50 97 %   05/17/18 2258 97.6 °F (36.4 °C) 63 18 135/75 96 %   05/17/18 1923 98.4 °F (36.9 °C) 72 18 115/66 94 %   05/17/18 1502 98.8 °F (37.1 °C) 65 18 124/64 97 %   05/17/18 1121 98 °F (36.7 °C) (!) 59 18 113/69 96 %     Oxygen Therapy  O2 Sat (%): 97 % (05/18/18 0729)  Pulse via Oximetry: 106 beats per minute (05/11/18 2131)  O2 Device: Room air (05/11/18 1140)    Intake/Output Summary (Last 24 hours) at 05/18/18 0909  Last data filed at 05/18/18 0315   Gross per 24 hour   Intake             1455 ml   Output                0 ml   Net             1455 ml       General:    Well nourished. Alert. No distress.   Eyes:   Normal sclera. Extraocular movements intact. ENT:  Normocephalic, atraumatic. Moist mucous membranes  CV:   Regular rate and rhythm. No murmur, rub, or gallop. Lungs:  Clear to auscultation bilaterally. No wheezing, rhonchi, or rales. Abdomen: Soft, nontender, nondistended. Bowel sounds normal.   Extremities: Warm and dry. No cyanosis or edema. Mild tender on the medical aspect of the rt knee- no swelling or erythema. pt has picc line rt fore arm. Neurologic: CN II-XII grossly intact. Sensation intact. Skin:     No rashes or jaundice. Psych:  Normal mood and affect. Discharge Info:   Current Discharge Medication List      START taking these medications    Details   cefTRIAXone 2 gram 2 g, ADDaptor 1 Device IVPB 2 g by IntraVENous route every twenty-four (24) hours for 9 days. Qty: 9 Dose, Refills: 0         CONTINUE these medications which have NOT CHANGED    Details   amLODIPine (NORVASC) 5 mg tablet Take 5 mg by mouth daily. apixaban (ELIQUIS) 2.5 mg tablet Take 1 Tab by mouth two (2) times a day. Qty: 60 Tab, Refills: 5      dronedarone (MULTAQ) tab tablet Take 1 Tab by mouth two (2) times daily (with meals). Qty: 60 Tab, Refills: 5      donepezil (ARICEPT) 5 mg tablet Take  by mouth nightly. metoprolol tartrate (LOPRESSOR) 25 mg tablet Take 1 Tab by mouth two (2) times a day. Qty: 180 Tab, Refills: 3      multivitamin (ONE A DAY) tablet Take 1 Tab by mouth daily. loratadine (CLARITIN) 10 mg tablet Take 10 mg by mouth daily. Disposition: Rehab   Activity: As per rehab. Diet: DIET REGULAR  FOOD SVCS COMMENTS  DIET NUTRITIONAL SUPPLEMENTS All Meals; Ensure Enlive    Follow-up Appointments   Procedures    FOLLOW UP VISIT Appointment in: One Week Cbc,bmp and magnesium in a week - results to be followed by rehab physician. PICC line care Rt arm. Fall precautions. Decubitus ulcer precautions. F/u with pcp after discharge from Rehab in 1 week.      Cbc,bmp and magnesium in a week - results to be followed by rehab physician. PICC line care Rt arm. Fall precautions. Decubitus ulcer precautions. F/u with pcp after discharge from Rehab in 1 week. Standing Status:   Standing     Number of Occurrences:   1     Order Specific Question:   Appointment in     Answer: One Week         Follow-up Information     Follow up With Details Comments [address] [facility] [place]   [address] [address]  [phone]            Time spent in patient discharge planning and coordination 35 minutes.     Signed:  Devi Song MD

## 2018-05-18 NOTE — PROGRESS NOTES
Patient alert and oriented x2  Respirations present  Safety measures in place  No complaints at this time  Will continue to monitor

## 2018-05-18 NOTE — PROGRESS NOTES
SBAR from Kuldip Muñoz., RN. Alert with episodes of confusion, resting in bed. Respirations even and unlabored. Denies any pain or discomfort at this time. Call light within reach. Will continue to monitor.

## 2018-05-18 NOTE — PROGRESS NOTES
Problem: Falls - Risk of  Goal: *Absence of Falls  Document Alex Fall Risk and appropriate interventions in the flowsheet.    Outcome: Progressing Towards Goal  Fall Risk Interventions:  Mobility Interventions: Bed/chair exit alarm, Patient to call before getting OOB    Mentation Interventions: Adequate sleep, hydration, pain control, Bed/chair exit alarm, Door open when patient unattended, Increase mobility, More frequent rounding    Medication Interventions: Bed/chair exit alarm, Teach patient to arise slowly    Elimination Interventions: Bed/chair exit alarm, Call light in reach, Patient to call for help with toileting needs    History of Falls Interventions: Bed/chair exit alarm

## 2018-05-18 NOTE — PROGRESS NOTES
[facility] [place] [place] CM informed daughter of discharge and  time. Room and report line given to RN.

## 2018-05-19 LAB
BODY FLD TYPE: NORMAL
CRYSTALS FLD MICRO: NORMAL

## 2018-09-12 NOTE — PROGRESS NOTES
Pt resting in bed alert with periods of confusion, incontinent with urine, bilateral edema noted in both lower extremeties, safety measures in place, call light within reach. Normal rate, regular rhythm, normal S1, S2 heart sounds heard.

## 2019-04-22 PROBLEM — R78.81 BACTEREMIA DUE TO STREPTOCOCCUS: Status: RESOLVED | Noted: 2018-05-12 | Resolved: 2019-04-22

## 2019-04-22 PROBLEM — B95.5 BACTEREMIA DUE TO STREPTOCOCCUS: Status: RESOLVED | Noted: 2018-05-12 | Resolved: 2019-04-22

## 2019-04-22 PROBLEM — R78.81 BACTEREMIA: Status: RESOLVED | Noted: 2018-05-12 | Resolved: 2019-04-22
